# Patient Record
Sex: MALE | Race: WHITE | NOT HISPANIC OR LATINO | Employment: FULL TIME | URBAN - METROPOLITAN AREA
[De-identification: names, ages, dates, MRNs, and addresses within clinical notes are randomized per-mention and may not be internally consistent; named-entity substitution may affect disease eponyms.]

---

## 2017-03-07 ENCOUNTER — TELEPHONE (OUTPATIENT)
Dept: ONCOLOGY | Facility: CLINIC | Age: 52
End: 2017-03-07

## 2017-03-07 ENCOUNTER — TELEPHONE (OUTPATIENT)
Dept: ONCOLOGY | Facility: HOSPITAL | Age: 52
End: 2017-03-07

## 2017-03-07 DIAGNOSIS — C20 RECTAL CANCER (HCC): Primary | ICD-10-CM

## 2017-03-07 NOTE — TELEPHONE ENCOUNTER
----- Message from Dane Viera sent at 3/6/2017  6:39 PM EST -----  Regarding: Visit Follow-Up Question  Contact: [phone]  Please let me know what the timeline is for my post-treatment follow ups, scans, tests etc. My last surgery, for the reversal, was on November 7, 2016.     I have another appointment with Dr. Aguilera on March 7th and would like to have the overall scheduling coordinated here so I know when I should be in for all follow up tests. To date, I have not heard anything from [facility] since October of last year.    Tree Viera

## 2017-03-07 NOTE — TELEPHONE ENCOUNTER
Pt sent [employer] message because he was wondering about follow up care. Pt has not been seen here since October. D/W Dr. Pardo. Per Dr. Pardo, pt needs to be set up to see her next week. Informed pt and he v/u. Message sent to scheduling to make f/u appt with Dr. Pardo.

## 2017-03-07 NOTE — TELEPHONE ENCOUNTER
"Pt. S/w little asher rn earlier this morning.  Expresses anger over someone not calling him with appts juliette for cea levels that need to be drawn frequently.  Pt. States, \" you call yourself a comprehensive cancer c[facility], but your not.\"  Saw dr. bales yesterday and she was wondering why pt. Didn't have cea levels checked.  Explained to pt. That the last time he saw dr. Pardo was 10/18/16 and in her assessment/plan that pt. Ret. In 2 wks for a cbc preoperatively and in 6 wks for a md appt here.  Pt. Cancelled appt sched. For 11/29/16 per televox.  Noticed pt. Cancelled appt with dr. ann on 11/4/16.  Did go for scans on 11/1/16. Pt. Never called back to resched. His appt.  Informed pt. Of this.  Informed pt. We do send out letters when pt are a complete \"no show.\"  Explained to pt. That due to the volume of pts we see on a daily basis it would be difficult for our staff to f/u with every pt That cancelled their appts.  Pt. Hung up the phone.  Pt. Was basically calling to make sure we add a cea level to the appt we gave him for 3/15/17.  The above conversation was discussed with dr. Pardo.  Will add a cea level to 3/15/17 appt.  Called pt. Back and left this message on his voice mail.  "

## 2017-03-07 NOTE — TELEPHONE ENCOUNTER
----- Message from Faye Figueroa sent at 3/7/2017 11:06 AM EST -----  Contact: [phone]  Return call. Had another question for nurse ?

## 2017-03-15 ENCOUNTER — APPOINTMENT (OUTPATIENT)
Dept: ONCOLOGY | Facility: CLINIC | Age: 52
End: 2017-03-15

## 2017-03-15 ENCOUNTER — APPOINTMENT (OUTPATIENT)
Dept: LAB | Facility: HOSPITAL | Age: 52
End: 2017-03-15

## 2017-03-21 ENCOUNTER — APPOINTMENT (OUTPATIENT)
Dept: ONCOLOGY | Facility: CLINIC | Age: 52
End: 2017-03-21

## 2017-03-21 ENCOUNTER — APPOINTMENT (OUTPATIENT)
Dept: LAB | Facility: HOSPITAL | Age: 52
End: 2017-03-21

## 2017-03-29 ENCOUNTER — LAB (OUTPATIENT)
Dept: LAB | Facility: HOSPITAL | Age: 52
End: 2017-03-29

## 2017-03-29 ENCOUNTER — OFFICE VISIT (OUTPATIENT)
Dept: ONCOLOGY | Facility: CLINIC | Age: 52
End: 2017-03-29

## 2017-03-29 VITALS
SYSTOLIC BLOOD PRESSURE: 124 MMHG | BODY MASS INDEX: 24.78 KG/M2 | HEART RATE: 62 BPM | OXYGEN SATURATION: 98 % | WEIGHT: 177 LBS | HEIGHT: 71 IN | RESPIRATION RATE: 16 BRPM | TEMPERATURE: 97.8 F | DIASTOLIC BLOOD PRESSURE: 84 MMHG

## 2017-03-29 DIAGNOSIS — C20 RECTAL CANCER (HCC): Primary | ICD-10-CM

## 2017-03-29 DIAGNOSIS — C20 RECTAL CANCER (HCC): ICD-10-CM

## 2017-03-29 LAB
BASOPHILS # BLD AUTO: 0.04 10*3/MM3 (ref 0–0.1)
BASOPHILS NFR BLD AUTO: 1 % (ref 0–1.1)
CEA SERPL-MCNC: 1.74 NG/ML
DEPRECATED RDW RBC AUTO: 40.5 FL (ref 37–49)
EOSINOPHIL # BLD AUTO: 0.24 10*3/MM3 (ref 0–0.36)
EOSINOPHIL NFR BLD AUTO: 6 % (ref 1–5)
ERYTHROCYTE [DISTWIDTH] IN BLOOD BY AUTOMATED COUNT: 11.9 % (ref 11.7–14.5)
HCT VFR BLD AUTO: 40.8 % (ref 40–49)
HGB BLD-MCNC: 13.6 G/DL (ref 13.5–16.5)
IMM GRANULOCYTES # BLD: 0.02 10*3/MM3 (ref 0–0.03)
IMM GRANULOCYTES NFR BLD: 0.5 % (ref 0–0.5)
LYMPHOCYTES # BLD AUTO: 1.35 10*3/MM3 (ref 1–3.5)
LYMPHOCYTES NFR BLD AUTO: 33.5 % (ref 20–49)
MCH RBC QN AUTO: 30.8 PG (ref 27–33)
MCHC RBC AUTO-ENTMCNC: 33.3 G/DL (ref 32–35)
MCV RBC AUTO: 92.3 FL (ref 83–97)
MONOCYTES # BLD AUTO: 0.52 10*3/MM3 (ref 0.25–0.8)
MONOCYTES NFR BLD AUTO: 12.9 % (ref 4–12)
NEUTROPHILS # BLD AUTO: 1.86 10*3/MM3 (ref 1.5–7)
NEUTROPHILS NFR BLD AUTO: 46.1 % (ref 39–75)
NRBC BLD MANUAL-RTO: 0 /100 WBC (ref 0–0)
PLATELET # BLD AUTO: 168 10*3/MM3 (ref 150–375)
PMV BLD AUTO: 8.6 FL (ref 8.9–12.1)
RBC # BLD AUTO: 4.42 10*6/MM3 (ref 4.3–5.5)
WBC NRBC COR # BLD: 4.03 10*3/MM3 (ref 4–10)

## 2017-03-29 PROCEDURE — 82378 CARCINOEMBRYONIC ANTIGEN: CPT | Performed by: INTERNAL MEDICINE

## 2017-03-29 PROCEDURE — 99213 OFFICE O/P EST LOW 20 MIN: CPT | Performed by: INTERNAL MEDICINE

## 2017-03-29 PROCEDURE — 36415 COLL VENOUS BLD VENIPUNCTURE: CPT

## 2017-03-29 PROCEDURE — 85025 COMPLETE CBC W/AUTO DIFF WBC: CPT

## 2017-03-29 NOTE — PROGRESS NOTES
Subjective    REASONS FOR FOLLOW-UP : Rectal cancer      History of Present Illness    Mr. Viera is a 51-year-old man returning today for follow-up for rectal cancer.  He was treated neoadjuvantly with Xeloda and radiation to a dose of 5040 cGy completed in early March 2016.  He then underwent surgical resection by Dr. Aguilera with final pathology showing a low-grade mucinous adenocarcinoma T3 N0 M0 with a questionable positive radial pelvic sidewall margin.  Surgical clips were placed in the area of concern in the pelvic sidewall, and he completed a radiation boost to this area.  The tumor had a regression score of 2 and histologic features suggestive of MSI; immunohistochemistry of tumor cells was positive for MLH-1, MSH-2, and MSH-6.  He completed 8 cycles of adjuvant FOLFOX 6/10/1816 with dose reductions for neuropathy and diarrhea.    He returned to clinic today upset that he had not had a CEA checked in some time.  This had been scheduled for him in November and he canceled the appointment.  He felt letdown that our clinic didn't call to reschedule his surveillance visits, and I explained to the patient that when he cancels an appointment the responsibility is with himself to call back and get things rescheduled.  He did not agree with this.    From a symptom standpoint, he is having no weight loss, abdominal pain, blood in the stool.  He denies shortness of breath or cough.  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  His ostomy has been reversed.    .    Oncology/Hematology History    1.  Rectal cancer diagnosed January 2016 after presenting with a several month history of rectal bleeding, diarrhea and 10 lb weight loss; clinical stage T3N1M0  2.  Completed neoadjuvant Xeloda with radiation (5040cGy) 3/2/16  3. Surgery May 9 2016  pathology revealed a residual 5cm invasive mucinous adenocarcinoma, low grade, invading through the muscularis propria and into the perirectal adipose  tissue with an initial positive radial margin. There was no evidence of lymphvascular space invasion and thirteen lymph nodes were negative for metastatic involvement. Additional distal and radial margin re-excision specimens were negative for carcinoma and excision of right pelvic lateral sidewall was negative for carcinoma. According to Dr. Aguilera's operative report, the rectum/rectal tumor was densely adhered to the right pelvic side wall and she placed clips in this area for possible adjuvant radiation boost. The initial frozen margins came back negative but due to her concern, she submitted additional right lateral and distal margins and according to the pathology report these additional specimens were negative for carcinoma. However, a comment on the path report states that an permanent sections an additional section of tumor bed was taken, revealing invasive carcinoma that focally extended to the radial margin and this positive margin was within 5mm fo the area taken on frozen section and it is unclear whether the additional radial margin would have covered this; he completed a post-surgery radiation boost to the right pelvic sidewall  4.  Initiated adjuvant FOLFOX-6 6/29/16 and 8 cycles completed 10/18/16 with dose reductions secondary to neuropathy and diarrhea        Rectal cancer    1/19/2016 Initial Diagnosis    Rectal cancer       PAST MEDICAL HISTORY:   1. Anxiety.   2. History of septal nose surgery in 1996.   3. History of benign cyst removed from the left foot in 1983.      SOCIAL HISTORY: He is , has 1 daughter. He is currently employed as a . He has never smoked. Drinks 2-3 glasses of wine per month.  Denies illicit drug use or HIV risk factors.     FAMILY HISTORY: His maternal grandmother had colon cancer in her 70s. No other malignancies in the family. His father had heart disease, hypertension and diabetes.         Review of Systems   Constitutional: Negative for  "activity change, fatigue and unexpected weight change.   Respiratory: Negative for shortness of breath and wheezing.    Cardiovascular: Negative for chest pain and leg swelling.   Gastrointestinal: Negative for abdominal pain, diarrhea, nausea and rectal pain.   Musculoskeletal: Negative for arthralgias and back pain.   Skin: Negative for color change.   Neurological: Negative for dizziness and weakness.      A comprehensive 14 point review of systems was performed and was negative except as mentioned.    Medications:  The current medication list was reviewed in the EMR    ALLERGIES:  No Known Allergies    Objective      Vitals:    03/29/17 1053   BP: 124/84   Pulse: 62   Resp: 16   Temp: 97.8 °F (36.6 °C)   SpO2: 98%   Weight: 177 lb (80.3 kg)   Height: 71.26\" (181 cm)   PainSc: 0-No pain     Current Status 3/29/2017   ECOG score 0       Physical Exam   Constitutional: He appears well-developed and well-nourished. No distress.   Eyes: Left eye exhibits no discharge. No scleral icterus.   Cardiovascular: Exam reveals no friction rub.    No murmur heard.  Pulmonary/Chest: No respiratory distress. He exhibits no tenderness.   Abdominal: He exhibits no mass.   Ostomy reversed with well-healed scar; no palpable masses or tenderness   Musculoskeletal: He exhibits no edema.   Lymphadenopathy:     He has no cervical adenopathy.   Skin: Skin is warm and dry.   Psychiatric: He has a normal mood and affect.         RECENT LABS:  Hematology WBC   Date Value Ref Range Status   03/29/2017 4.03 4.00 - 10.00 10*3/mm3 Final     RBC   Date Value Ref Range Status   03/29/2017 4.42 4.30 - 5.50 10*6/mm3 Final     Hemoglobin   Date Value Ref Range Status   03/29/2017 13.6 13.5 - 16.5 g/dL Final     Hematocrit   Date Value Ref Range Status   03/29/2017 40.8 40.0 - 49.0 % Final     Platelets   Date Value Ref Range Status   03/29/2017 168 150 - 375 10*3/mm3 Final     Lab Results   Component Value Date    GLUCOSE 91 10/18/2016    BUN 10 " 10/18/2016    CREATININE 0.90 11/01/2016    EGFRIFNONA 100 10/18/2016    EGFRIFAFRI  09/20/2016      Comment:      <15 Indicative of kidney failure.    BCR 12.2 10/18/2016    K 3.7 10/18/2016    CO2 25.0 10/18/2016    CALCIUM 8.8 10/18/2016    ALBUMIN 4.00 10/18/2016    LABIL2 1.5 10/18/2016    AST 33 10/18/2016    ALT 34 10/18/2016     cea pending           Assessment/Plan   Mr. Viera returned today for continuation of care for his rectal cancer.  He underwent neoadjuvant chemoradiation for a clinical T3 N1 M0 rectal cancer.  At the time of surgery final pathology showed YT3 N0 M0 disease with 13 negative lymph nodes.  There was a questionable positive radial margin involving the right pelvic sidewall and he completed a radiation boost to this area. He completed 8 cycles of FOLFOX 6 with dose reductions in October 2016.  We have drawn a CEA level today which is pending; the patient can review results on my chart and call us if there are questions.  I recommended that we perform surveillance CT scans of chest, abdomen, and pelvis every 6 months for the first 2 years and then annually until year 5.  He will be due for follow-up scans in 2 months which I scheduled along with a repeat CBC, CEA, and CMP.  He has mild peripheral neuropathy which I recommended perhaps a B complex vitamin could help.  Lastly, I referred the patients to genetics for review given his early age of colorectal cancer and pathologic features.    As an aside, I explained to the patient if he cancels appointments, the responsibility lies with him to call back and reschedule.            3/29/2017      CC:

## 2017-05-09 ENCOUNTER — CLINICAL SUPPORT (OUTPATIENT)
Dept: OTHER | Facility: HOSPITAL | Age: 52
End: 2017-05-09
Attending: INTERNAL MEDICINE

## 2017-05-09 DIAGNOSIS — Z80.3 FAMILY HISTORY OF MALIGNANT NEOPLASM OF BREAST: ICD-10-CM

## 2017-05-09 DIAGNOSIS — C20 MALIGNANT NEOPLASM OF RECTUM (HCC): Primary | ICD-10-CM

## 2017-05-09 DIAGNOSIS — Z80.0 FAMILY HISTORY OF MALIGNANT NEOPLASM OF GASTROINTESTINAL TRACT: ICD-10-CM

## 2017-05-09 PROCEDURE — G0463 HOSPITAL OUTPT CLINIC VISIT: HCPCS

## 2017-05-17 ENCOUNTER — LAB (OUTPATIENT)
Dept: LAB | Facility: HOSPITAL | Age: 52
End: 2017-05-17

## 2017-05-17 ENCOUNTER — HOSPITAL ENCOUNTER (OUTPATIENT)
Dept: PET IMAGING | Facility: HOSPITAL | Age: 52
Discharge: HOME | End: 2017-05-17
Attending: INTERNAL MEDICINE | Admitting: INTERNAL MEDICINE

## 2017-05-17 DIAGNOSIS — C20 RECTAL CANCER (HCC): ICD-10-CM

## 2017-05-17 LAB
ALBUMIN SERPL-MCNC: 4.5 G/DL (ref 3.5–5.2)
ALBUMIN/GLOB SERPL: 1.6 G/DL (ref 1.1–2.4)
ALP SERPL-CCNC: 80 U/L (ref 38–116)
ALT SERPL W P-5'-P-CCNC: 28 U/L (ref 0–41)
ANION GAP SERPL CALCULATED.3IONS-SCNC: 13.8 MMOL/L
AST SERPL-CCNC: 25 U/L (ref 0–40)
BASOPHILS # BLD AUTO: 0.05 10*3/MM3 (ref 0–0.1)
BASOPHILS NFR BLD AUTO: 1.1 % (ref 0–1.1)
BILIRUB SERPL-MCNC: 0.7 MG/DL (ref 0.1–1.2)
BUN BLD-MCNC: 12 MG/DL (ref 6–20)
BUN/CREAT SERPL: 14.5 (ref 7.3–30)
CALCIUM SPEC-SCNC: 9.3 MG/DL (ref 8.5–10.2)
CEA SERPL-MCNC: 1.92 NG/ML
CHLORIDE SERPL-SCNC: 96 MMOL/L (ref 98–107)
CO2 SERPL-SCNC: 28.2 MMOL/L (ref 22–29)
CREAT BLD-MCNC: 0.83 MG/DL (ref 0.7–1.3)
CREAT BLDA-MCNC: 0.9 MG/DL (ref 0.6–1.3)
DEPRECATED RDW RBC AUTO: 43.1 FL (ref 37–49)
EOSINOPHIL # BLD AUTO: 0.14 10*3/MM3 (ref 0–0.36)
EOSINOPHIL NFR BLD AUTO: 3 % (ref 1–5)
ERYTHROCYTE [DISTWIDTH] IN BLOOD BY AUTOMATED COUNT: 12.3 % (ref 11.7–14.5)
GFR SERPL CREATININE-BSD FRML MDRD: 98 ML/MIN/1.73
GLOBULIN UR ELPH-MCNC: 2.8 GM/DL (ref 1.8–3.5)
GLUCOSE BLD-MCNC: 83 MG/DL (ref 74–124)
HCT VFR BLD AUTO: 41.7 % (ref 40–49)
HGB BLD-MCNC: 14.4 G/DL (ref 13.5–16.5)
IMM GRANULOCYTES # BLD: 0 10*3/MM3 (ref 0–0.03)
IMM GRANULOCYTES NFR BLD: 0 % (ref 0–0.5)
LYMPHOCYTES # BLD AUTO: 1.42 10*3/MM3 (ref 1–3.5)
LYMPHOCYTES NFR BLD AUTO: 30 % (ref 20–49)
MCH RBC QN AUTO: 32.5 PG (ref 27–33)
MCHC RBC AUTO-ENTMCNC: 34.5 G/DL (ref 32–35)
MCV RBC AUTO: 94.1 FL (ref 83–97)
MONOCYTES # BLD AUTO: 0.49 10*3/MM3 (ref 0.25–0.8)
MONOCYTES NFR BLD AUTO: 10.3 % (ref 4–12)
NEUTROPHILS # BLD AUTO: 2.64 10*3/MM3 (ref 1.5–7)
NEUTROPHILS NFR BLD AUTO: 55.6 % (ref 39–75)
NRBC BLD MANUAL-RTO: 0 /100 WBC (ref 0–0)
PLATELET # BLD AUTO: 232 10*3/MM3 (ref 150–375)
PMV BLD AUTO: 9.4 FL (ref 8.9–12.1)
POTASSIUM BLD-SCNC: 3.6 MMOL/L (ref 3.5–4.7)
PROT SERPL-MCNC: 7.3 G/DL (ref 6.3–8)
RBC # BLD AUTO: 4.43 10*6/MM3 (ref 4.3–5.5)
SODIUM BLD-SCNC: 138 MMOL/L (ref 134–145)
WBC NRBC COR # BLD: 4.74 10*3/MM3 (ref 4–10)

## 2017-05-17 PROCEDURE — 71260 CT THORAX DX C+: CPT

## 2017-05-17 PROCEDURE — 0 IOPAMIDOL 61 % SOLUTION: Performed by: INTERNAL MEDICINE

## 2017-05-17 PROCEDURE — 82565 ASSAY OF CREATININE: CPT

## 2017-05-17 PROCEDURE — 0 DIATRIZOATE MEGLUMINE & SODIUM PER 1 ML: Performed by: INTERNAL MEDICINE

## 2017-05-17 PROCEDURE — 74177 CT ABD & PELVIS W/CONTRAST: CPT

## 2017-05-17 PROCEDURE — 36415 COLL VENOUS BLD VENIPUNCTURE: CPT

## 2017-05-17 PROCEDURE — 82378 CARCINOEMBRYONIC ANTIGEN: CPT | Performed by: INTERNAL MEDICINE

## 2017-05-17 PROCEDURE — 85025 COMPLETE CBC W/AUTO DIFF WBC: CPT

## 2017-05-17 PROCEDURE — 80053 COMPREHEN METABOLIC PANEL: CPT

## 2017-05-17 RX ADMIN — IOPAMIDOL 85 ML: 612 INJECTION, SOLUTION INTRAVENOUS at 08:50

## 2017-05-17 RX ADMIN — DIATRIZOATE MEGLUMINE AND DIATRIZOATE SODIUM 30 ML: 660; 100 LIQUID ORAL; RECTAL at 07:49

## 2017-05-22 RX ORDER — ESCITALOPRAM OXALATE 20 MG/1
TABLET ORAL
Qty: 30 TABLET | Refills: 5 | Status: SHIPPED | OUTPATIENT
Start: 2017-05-22 | End: 2017-11-22 | Stop reason: SDUPTHER

## 2017-05-24 ENCOUNTER — APPOINTMENT (OUTPATIENT)
Dept: LAB | Facility: HOSPITAL | Age: 52
End: 2017-05-24

## 2017-05-24 ENCOUNTER — OFFICE VISIT (OUTPATIENT)
Dept: ONCOLOGY | Facility: CLINIC | Age: 52
End: 2017-05-24

## 2017-05-24 VITALS
RESPIRATION RATE: 16 BRPM | SYSTOLIC BLOOD PRESSURE: 112 MMHG | DIASTOLIC BLOOD PRESSURE: 78 MMHG | HEART RATE: 61 BPM | HEIGHT: 71 IN | TEMPERATURE: 97.7 F | BODY MASS INDEX: 24.72 KG/M2 | WEIGHT: 176.6 LBS | OXYGEN SATURATION: 98 %

## 2017-05-24 DIAGNOSIS — C20 RECTAL CANCER (HCC): Primary | ICD-10-CM

## 2017-05-24 DIAGNOSIS — E53.8 B12 DEFICIENCY: ICD-10-CM

## 2017-05-24 PROCEDURE — G0463 HOSPITAL OUTPT CLINIC VISIT: HCPCS | Performed by: INTERNAL MEDICINE

## 2017-05-24 PROCEDURE — 99214 OFFICE O/P EST MOD 30 MIN: CPT | Performed by: INTERNAL MEDICINE

## 2017-05-30 ENCOUNTER — CLINICAL SUPPORT (OUTPATIENT)
Dept: OTHER | Facility: HOSPITAL | Age: 52
End: 2017-05-30

## 2017-05-30 DIAGNOSIS — C20 MALIGNANT NEOPLASM OF RECTUM (HCC): Primary | ICD-10-CM

## 2017-05-30 DIAGNOSIS — Z80.3 FAMILY HISTORY OF MALIGNANT NEOPLASM OF BREAST: ICD-10-CM

## 2017-05-30 DIAGNOSIS — Z80.0 FAMILY HISTORY OF MALIGNANT NEOPLASM OF GASTROINTESTINAL TRACT: ICD-10-CM

## 2017-05-30 PROCEDURE — G0463 HOSPITAL OUTPT CLINIC VISIT: HCPCS

## 2017-06-13 RX ORDER — CYCLOBENZAPRINE HCL 10 MG
10 TABLET ORAL 3 TIMES DAILY PRN
Qty: 20 TABLET | Refills: 0 | Status: SHIPPED | OUTPATIENT
Start: 2017-06-13 | End: 2017-08-11

## 2017-06-13 NOTE — TELEPHONE ENCOUNTER
Patient calling c/o low back pain. He feels like he can hardly move at times. Its been going on the past 2 weeks. Wants to know if this is something to be concerned about? Reviewed with Dr Pardo, it sounds like it could be muscular related. Ok to prescribed patient Flexoril 10mg TID PRN #20 No refills. If patient has problems after using this then he will need to be seen by somebody for it. Reviewed with patient and he v/u. Medication sent to [facility]

## 2017-08-07 ENCOUNTER — HOSPITAL ENCOUNTER (OUTPATIENT)
Dept: PET IMAGING | Facility: HOSPITAL | Age: 52
Discharge: HOME | End: 2017-08-07
Attending: INTERNAL MEDICINE

## 2017-08-07 ENCOUNTER — TELEPHONE (OUTPATIENT)
Dept: GENERAL RADIOLOGY | Facility: HOSPITAL | Age: 52
End: 2017-08-07

## 2017-08-08 ENCOUNTER — HOSPITAL ENCOUNTER (OUTPATIENT)
Dept: PET IMAGING | Facility: HOSPITAL | Age: 52
Discharge: HOME | End: 2017-08-08
Attending: INTERNAL MEDICINE | Admitting: INTERNAL MEDICINE

## 2017-08-08 ENCOUNTER — LAB (OUTPATIENT)
Dept: LAB | Facility: HOSPITAL | Age: 52
End: 2017-08-08

## 2017-08-08 DIAGNOSIS — C20 RECTAL CANCER (HCC): ICD-10-CM

## 2017-08-08 DIAGNOSIS — E53.8 B12 DEFICIENCY: ICD-10-CM

## 2017-08-08 LAB
ALBUMIN SERPL-MCNC: 4.4 G/DL (ref 3.5–5.2)
ALBUMIN/GLOB SERPL: 1.6 G/DL (ref 1.1–2.4)
ALP SERPL-CCNC: 67 U/L (ref 38–116)
ALT SERPL W P-5'-P-CCNC: 26 U/L (ref 0–41)
ANION GAP SERPL CALCULATED.3IONS-SCNC: 15.5 MMOL/L
AST SERPL-CCNC: 23 U/L (ref 0–40)
BASOPHILS # BLD AUTO: 0.05 10*3/MM3 (ref 0–0.1)
BASOPHILS NFR BLD AUTO: 1.2 % (ref 0–1.1)
BILIRUB SERPL-MCNC: 1.5 MG/DL (ref 0.1–1.2)
BUN BLD-MCNC: 13 MG/DL (ref 6–20)
BUN/CREAT SERPL: 15.3 (ref 7.3–30)
CALCIUM SPEC-SCNC: 9.2 MG/DL (ref 8.5–10.2)
CEA SERPL-MCNC: 1.84 NG/ML
CHLORIDE SERPL-SCNC: 101 MMOL/L (ref 98–107)
CO2 SERPL-SCNC: 26.5 MMOL/L (ref 22–29)
CREAT BLD-MCNC: 0.85 MG/DL (ref 0.7–1.3)
CREAT BLDA-MCNC: 1 MG/DL (ref 0.6–1.3)
DEPRECATED RDW RBC AUTO: 39.2 FL (ref 37–49)
EOSINOPHIL # BLD AUTO: 0.09 10*3/MM3 (ref 0–0.36)
EOSINOPHIL NFR BLD AUTO: 2.1 % (ref 1–5)
ERYTHROCYTE [DISTWIDTH] IN BLOOD BY AUTOMATED COUNT: 11.9 % (ref 11.7–14.5)
GFR SERPL CREATININE-BSD FRML MDRD: 95 ML/MIN/1.73
GLOBULIN UR ELPH-MCNC: 2.7 GM/DL (ref 1.8–3.5)
GLUCOSE BLD-MCNC: 92 MG/DL (ref 74–124)
HCT VFR BLD AUTO: 43.2 % (ref 40–49)
HGB BLD-MCNC: 15.1 G/DL (ref 13.5–16.5)
IMM GRANULOCYTES # BLD: 0.01 10*3/MM3 (ref 0–0.03)
IMM GRANULOCYTES NFR BLD: 0.2 % (ref 0–0.5)
LYMPHOCYTES # BLD AUTO: 1.23 10*3/MM3 (ref 1–3.5)
LYMPHOCYTES NFR BLD AUTO: 29.1 % (ref 20–49)
MCH RBC QN AUTO: 31.7 PG (ref 27–33)
MCHC RBC AUTO-ENTMCNC: 35 G/DL (ref 32–35)
MCV RBC AUTO: 90.8 FL (ref 83–97)
MONOCYTES # BLD AUTO: 0.33 10*3/MM3 (ref 0.25–0.8)
MONOCYTES NFR BLD AUTO: 7.8 % (ref 4–12)
NEUTROPHILS # BLD AUTO: 2.51 10*3/MM3 (ref 1.5–7)
NEUTROPHILS NFR BLD AUTO: 59.6 % (ref 39–75)
NRBC BLD MANUAL-RTO: 0 /100 WBC (ref 0–0)
PLATELET # BLD AUTO: 253 10*3/MM3 (ref 150–375)
PMV BLD AUTO: 9.5 FL (ref 8.9–12.1)
POTASSIUM BLD-SCNC: 3.9 MMOL/L (ref 3.5–4.7)
PROT SERPL-MCNC: 7.1 G/DL (ref 6.3–8)
RBC # BLD AUTO: 4.76 10*6/MM3 (ref 4.3–5.5)
SODIUM BLD-SCNC: 143 MMOL/L (ref 134–145)
WBC NRBC COR # BLD: 4.22 10*3/MM3 (ref 4–10)

## 2017-08-08 PROCEDURE — 80053 COMPREHEN METABOLIC PANEL: CPT | Performed by: INTERNAL MEDICINE

## 2017-08-08 PROCEDURE — 0 IOPAMIDOL 61 % SOLUTION: Performed by: INTERNAL MEDICINE

## 2017-08-08 PROCEDURE — 85025 COMPLETE CBC W/AUTO DIFF WBC: CPT | Performed by: INTERNAL MEDICINE

## 2017-08-08 PROCEDURE — 82378 CARCINOEMBRYONIC ANTIGEN: CPT | Performed by: INTERNAL MEDICINE

## 2017-08-08 PROCEDURE — 71260 CT THORAX DX C+: CPT

## 2017-08-08 PROCEDURE — 82565 ASSAY OF CREATININE: CPT

## 2017-08-08 PROCEDURE — 36415 COLL VENOUS BLD VENIPUNCTURE: CPT | Performed by: INTERNAL MEDICINE

## 2017-08-08 RX ADMIN — IOPAMIDOL 75 ML: 612 INJECTION, SOLUTION INTRAVENOUS at 12:57

## 2017-08-11 ENCOUNTER — APPOINTMENT (OUTPATIENT)
Dept: LAB | Facility: HOSPITAL | Age: 52
End: 2017-08-11

## 2017-08-11 ENCOUNTER — OFFICE VISIT (OUTPATIENT)
Dept: ONCOLOGY | Facility: CLINIC | Age: 52
End: 2017-08-11

## 2017-08-11 VITALS
BODY MASS INDEX: 24.85 KG/M2 | SYSTOLIC BLOOD PRESSURE: 102 MMHG | HEART RATE: 60 BPM | DIASTOLIC BLOOD PRESSURE: 68 MMHG | HEIGHT: 70 IN | TEMPERATURE: 98 F | WEIGHT: 173.6 LBS | RESPIRATION RATE: 14 BRPM | OXYGEN SATURATION: 97 %

## 2017-08-11 DIAGNOSIS — E53.8 B12 DEFICIENCY: ICD-10-CM

## 2017-08-11 DIAGNOSIS — C20 RECTAL CANCER (HCC): Primary | ICD-10-CM

## 2017-08-11 PROCEDURE — G0463 HOSPITAL OUTPT CLINIC VISIT: HCPCS | Performed by: INTERNAL MEDICINE

## 2017-08-11 PROCEDURE — 99214 OFFICE O/P EST MOD 30 MIN: CPT | Performed by: INTERNAL MEDICINE

## 2017-08-11 NOTE — PROGRESS NOTES
Subjective    REASONS FOR FOLLOW-UP :  1.  Rectal cancer T3N1M0 treated with neoadjuvant chemoradiation  2. ypT3N0 at surgery 5/17 with a positive radial margin reexcised and treated with radiation boost to the right pelvic sidewall postoperatively  3.  Adjuvant FOLFOX 6 x 8 completed in 10/16  4.  Additional surgery in 11/16 with colostomy reversal  5.  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  Unexplained small bilateral pleural effusions first noted in 5/17 not seen in 11/16 CAT scan of the chest     History of Present Illness    Mr. Viera is a 51-year-old man returning today for follow-up for rectal cancer.  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  He is here to review his CAT scans of the chest because of the small nondisplaced pleural effusions and unfortunately they're still present and maybe microscopically little more prominent.  He is asymptomatic from this.  He did call with some low back pain in the sacroiliac region for which we prescribed some Flexeril which apparently did not help.  The pain gradually resolved and is gone currently and may have been related to some pushups and planks that he was doing to keep himself fit.    I suggested a repeat CAT scan in 3 months and a pulmonary consultation in the interim to see if they have any other suggestions.  His CEA remains normal at 1.82    Oncology/Hematology History    1.  Rectal cancer diagnosed January 2016 after presenting with a several month history of rectal bleeding, diarrhea and 10 lb weight loss; clinical stage T3N1M0  2.  Completed neoadjuvant Xeloda with radiation (5040cGy) 3/2/16  3. Surgery May 9 2016  pathology revealed a residual 5cm invasive mucinous adenocarcinoma, low grade, invading through the muscularis propria and into the perirectal adipose tissue  with an initial positive radial margin. There was no evidence of lymphvascular space invasion and thirteen lymph nodes were negative for metastatic involvement. Additional distal and radial margin re-excision specimens were negative for carcinoma and excision of right pelvic lateral sidewall was negative for carcinoma. According to Dr. Aguilera's operative report, the rectum/rectal tumor was densely adhered to the right pelvic side wall and she placed clips in this area for possible adjuvant radiation boost. The initial frozen margins came back negative but due to her concern, she submitted additional right lateral and distal margins and according to the pathology report these additional specimens were negative for carcinoma. However, a comment on the path report states that an permanent sections an additional section of tumor bed was taken, revealing invasive carcinoma that focally extended to the radial margin and this positive margin was within 5mm fo the area taken on frozen section and it is unclear whether the additional radial margin would have covered this; he completed a post-surgery radiation boost to the right pelvic sidewall  4.  Initiated adjuvant FOLFOX-6 6/29/16 and 8 cycles completed 10/18/16 with dose reductions secondary to neuropathy and diarrhea        Rectal cancer    1/19/2016 Initial Diagnosis     Rectal cancer        5/17   He was treated neoadjuvantly with Xeloda and radiation to a dose of 5040 cGy completed in early March 2016.  He then underwent surgical resection by Dr. Aguilera with final pathology showing a low-grade mucinous adenocarcinoma T3 N0 M0 with a questionable positive radial pelvic sidewall margin.  Surgical clips were placed in the area of concern in the pelvic sidewall, and he completed a radiation boost to this area.  The tumor had a regression score of 2 and histologic features suggestive of MSI; immunohistochemistry of tumor cells was positive for MLH-1, MSH-2, and  [facility]-6.  He completed 8 cycles of adjuvant FOLFOX 6/10/1816 with dose reductions for neuropathy and diarrhea.    Patient returns today to review his CAT scans and overall is doing fairly well.  He has no cough or shortness of breath weight and appetite are stable.  His CEA was 1.92 which is normal.  CAT scans show minimal small pleural effusions which were not seen in November prior to his second surgery.  There are very small and not tappable and I suspect there residual from his surgery and I will try and get some x-rays from [facility] if they were done postoperatively.  I recommended we repeat his chest CT in 3 months to make sure there is resolution and no progression of these findings and overall I'm not very worried that these are related to metastatic disease.    He has had blood drawn for genetic testing and the results are pending.  He scheduled follow-up colonoscopy next in November of this year      PAST MEDICAL HISTORY:   1. Anxiety.   2. History of septal nose surgery in 1996.   3. History of benign cyst removed from the left foot in 1983.      SOCIAL HISTORY: He is , has 1 daughter. He is currently employed as a . He has never smoked. Drinks 2-3 glasses of wine per month.  Denies illicit drug use or HIV risk factors.     FAMILY HISTORY: His maternal grandmother had colon cancer in her 70s. No other malignancies in the family. His father had heart disease, hypertension and diabetes.         Review of Systems   Constitutional: Negative for activity change, fatigue and unexpected weight change.   Respiratory: Negative for shortness of breath and wheezing.    Cardiovascular: Negative for chest pain and leg swelling.   Gastrointestinal: Negative for abdominal pain, diarrhea, nausea and rectal pain.   Musculoskeletal: Negative for arthralgias and back pain.   Skin: Negative for color change.   Neurological: Negative for dizziness and weakness.      A  comprehensive 14 point review of systems was performed and was negative except as mentioned.    Medications:  The current medication list was reviewed in the EMR    ALLERGIES:  No Known Allergies    Objective      There were no vitals filed for this visit.  Current Status 8/11/2017   ECOG score 0       Physical Exam    GENERAL:  Well-developed, well-nourished in no acute distress.   SKIN:  Warm, dry without rashes, purpura or petechiae.  EYES:  Pupils equal, round and reactive to light.  EOMs intact.  Conjunctivae normal.  EARS:  Hearing intact.  NOSE:  Septum midline.  No excoriations or nasal discharge.  MOUTH:  Tongue is well-papillated; no stomatitis or ulcers.  Lips normal.  THROAT:  Oropharynx without lesions or exudates.  NECK:  Supple with good range of motion; no thyromegaly or masses, no JVD.  LYMPHATICS:  No cervical, supraclavicular, axillary or inguinal adenopathy.  CHEST:  Lungs clear to auscultation. Good airflow.  CARDIAC:  Regular rate and rhythm without murmurs, rubs or gallops. Normal S1,S2.  ABDOMEN:  Not examined  EXTREMITIES:  No clubbing, cyanosis or edema.  NEUROLOGICAL:  Cranial Nerves II-XII grossly intact.  No focal neurological deficits.  PSYCHIATRIC:  Normal affect and mood.  '    RECENT LABS:  Hematology WBC   Date Value Ref Range Status   08/08/2017 4.22 4.00 - 10.00 10*3/mm3 Final     RBC   Date Value Ref Range Status   08/08/2017 4.76 4.30 - 5.50 10*6/mm3 Final     Hemoglobin   Date Value Ref Range Status   08/08/2017 15.1 13.5 - 16.5 g/dL Final     Hematocrit   Date Value Ref Range Status   08/08/2017 43.2 40.0 - 49.0 % Final     Platelets   Date Value Ref Range Status   08/08/2017 253 150 - 375 10*3/mm3 Final     Lab Results   Component Value Date    GLUCOSE 92 08/08/2017    BUN 13 08/08/2017    CREATININE 1.00 08/08/2017    EGFRIFNONA 95 08/08/2017    EGFRIFAFRI  09/20/2016      Comment:      <15 Indicative of kidney failure.    BCR 15.3 08/08/2017    K 3.9 08/08/2017    CO2 26.5  08/08/2017    CALCIUM 9.2 08/08/2017    ALBUMIN 4.40 08/08/2017    LABIL2 1.6 08/08/2017    AST 23 08/08/2017    ALT 26 08/08/2017     cea 1.84    Ct chest     IMPRESSION:  Slight interval increase in the tiny right pleural effusion  and marginal increase in the smaller tiny left pleural effusion.      This report was finalized on 8/10/2017     Assessment/Plan   1.t neoadjuvant chemoradiation for a clinical T3 N1 M0 rectal cancer.  At the time of surgery final pathology showed YT3 N0 M0 disease with 13 negative lymph nodes.  There was a questionable positive radial margin involving the right pelvic sidewall and he completed a radiation boost to this area. He completed 8 cycles of FOLFOX 6 with dose reductions in October 2016.    2.  Reversal of colostomy in 11/16  3.  Follow-up CAT scans and 5/17 showed bilateral very small pleural effusions of unknown etiology follow-up scan in 3 months planned CEA normal  4. 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

## 2017-11-03 ENCOUNTER — APPOINTMENT (OUTPATIENT)
Dept: PET IMAGING | Facility: HOSPITAL | Age: 52
End: 2017-11-03
Attending: INTERNAL MEDICINE

## 2017-11-03 DIAGNOSIS — C20 RECTAL CANCER (HCC): Primary | ICD-10-CM

## 2017-11-07 ENCOUNTER — HOSPITAL ENCOUNTER (OUTPATIENT)
Dept: PET IMAGING | Facility: HOSPITAL | Age: 52
Discharge: HOME | End: 2017-11-07
Attending: INTERNAL MEDICINE | Admitting: INTERNAL MEDICINE

## 2017-11-07 ENCOUNTER — LAB (OUTPATIENT)
Dept: LAB | Facility: HOSPITAL | Age: 52
End: 2017-11-07

## 2017-11-07 DIAGNOSIS — E53.8 B12 DEFICIENCY: ICD-10-CM

## 2017-11-07 DIAGNOSIS — C20 RECTAL CANCER (HCC): ICD-10-CM

## 2017-11-07 LAB
ALBUMIN SERPL-MCNC: 4.2 G/DL (ref 3.5–5.2)
ALBUMIN/GLOB SERPL: 1.6 G/DL (ref 1.1–2.4)
ALP SERPL-CCNC: 65 U/L (ref 38–116)
ALT SERPL W P-5'-P-CCNC: 19 U/L (ref 0–41)
ANION GAP SERPL CALCULATED.3IONS-SCNC: 9.7 MMOL/L
AST SERPL-CCNC: 17 U/L (ref 0–40)
BASOPHILS # BLD AUTO: 0.04 10*3/MM3 (ref 0–0.1)
BASOPHILS NFR BLD AUTO: 1 % (ref 0–1.1)
BILIRUB SERPL-MCNC: 0.8 MG/DL (ref 0.1–1.2)
BUN BLD-MCNC: 10 MG/DL (ref 6–20)
BUN/CREAT SERPL: 11.2 (ref 7.3–30)
CALCIUM SPEC-SCNC: 9.3 MG/DL (ref 8.5–10.2)
CHLORIDE SERPL-SCNC: 103 MMOL/L (ref 98–107)
CO2 SERPL-SCNC: 28.3 MMOL/L (ref 22–29)
CREAT BLD-MCNC: 0.89 MG/DL (ref 0.7–1.3)
CREAT BLDA-MCNC: 0.9 MG/DL (ref 0.6–1.3)
DEPRECATED RDW RBC AUTO: 40.6 FL (ref 37–49)
EOSINOPHIL # BLD AUTO: 0.1 10*3/MM3 (ref 0–0.36)
EOSINOPHIL NFR BLD AUTO: 2.4 % (ref 1–5)
ERYTHROCYTE [DISTWIDTH] IN BLOOD BY AUTOMATED COUNT: 11.8 % (ref 11.7–14.5)
GFR SERPL CREATININE-BSD FRML MDRD: 90 ML/MIN/1.73
GLOBULIN UR ELPH-MCNC: 2.6 GM/DL (ref 1.8–3.5)
GLUCOSE BLD-MCNC: 99 MG/DL (ref 74–124)
HCT VFR BLD AUTO: 41.1 % (ref 40–49)
HGB BLD-MCNC: 14.1 G/DL (ref 13.5–16.5)
IMM GRANULOCYTES # BLD: 0.01 10*3/MM3 (ref 0–0.03)
IMM GRANULOCYTES NFR BLD: 0.2 % (ref 0–0.5)
LYMPHOCYTES # BLD AUTO: 1.23 10*3/MM3 (ref 1–3.5)
LYMPHOCYTES NFR BLD AUTO: 29.9 % (ref 20–49)
MCH RBC QN AUTO: 31.9 PG (ref 27–33)
MCHC RBC AUTO-ENTMCNC: 34.3 G/DL (ref 32–35)
MCV RBC AUTO: 93 FL (ref 83–97)
MONOCYTES # BLD AUTO: 0.29 10*3/MM3 (ref 0.25–0.8)
MONOCYTES NFR BLD AUTO: 7 % (ref 4–12)
NEUTROPHILS # BLD AUTO: 2.45 10*3/MM3 (ref 1.5–7)
NEUTROPHILS NFR BLD AUTO: 59.5 % (ref 39–75)
NRBC BLD MANUAL-RTO: 0 /100 WBC (ref 0–0)
PLATELET # BLD AUTO: 227 10*3/MM3 (ref 150–375)
PMV BLD AUTO: 9.2 FL (ref 8.9–12.1)
POTASSIUM BLD-SCNC: 4.4 MMOL/L (ref 3.5–4.7)
PROT SERPL-MCNC: 6.8 G/DL (ref 6.3–8)
RBC # BLD AUTO: 4.42 10*6/MM3 (ref 4.3–5.5)
SODIUM BLD-SCNC: 141 MMOL/L (ref 134–145)
WBC NRBC COR # BLD: 4.12 10*3/MM3 (ref 4–10)

## 2017-11-07 PROCEDURE — 82565 ASSAY OF CREATININE: CPT

## 2017-11-07 PROCEDURE — 36415 COLL VENOUS BLD VENIPUNCTURE: CPT | Performed by: INTERNAL MEDICINE

## 2017-11-07 PROCEDURE — 80053 COMPREHEN METABOLIC PANEL: CPT | Performed by: INTERNAL MEDICINE

## 2017-11-07 PROCEDURE — 0 IOPAMIDOL 61 % SOLUTION: Performed by: INTERNAL MEDICINE

## 2017-11-07 PROCEDURE — 74177 CT ABD & PELVIS W/CONTRAST: CPT

## 2017-11-07 PROCEDURE — 85025 COMPLETE CBC W/AUTO DIFF WBC: CPT | Performed by: INTERNAL MEDICINE

## 2017-11-07 PROCEDURE — 0 DIATRIZOATE MEGLUMINE & SODIUM PER 1 ML: Performed by: INTERNAL MEDICINE

## 2017-11-07 PROCEDURE — 71260 CT THORAX DX C+: CPT

## 2017-11-07 RX ADMIN — DIATRIZOATE MEGLUMINE AND DIATRIZOATE SODIUM 30 ML: 660; 100 LIQUID ORAL; RECTAL at 13:55

## 2017-11-07 RX ADMIN — IOPAMIDOL 85 ML: 612 INJECTION, SOLUTION INTRAVENOUS at 14:42

## 2017-11-10 ENCOUNTER — OFFICE VISIT (OUTPATIENT)
Dept: ONCOLOGY | Facility: CLINIC | Age: 52
End: 2017-11-10

## 2017-11-10 ENCOUNTER — APPOINTMENT (OUTPATIENT)
Dept: LAB | Facility: HOSPITAL | Age: 52
End: 2017-11-10

## 2017-11-10 VITALS
OXYGEN SATURATION: 97 % | WEIGHT: 175.4 LBS | RESPIRATION RATE: 16 BRPM | HEART RATE: 59 BPM | BODY MASS INDEX: 25.11 KG/M2 | SYSTOLIC BLOOD PRESSURE: 118 MMHG | TEMPERATURE: 97.9 F | DIASTOLIC BLOOD PRESSURE: 86 MMHG | HEIGHT: 70 IN

## 2017-11-10 DIAGNOSIS — C20 RECTAL CANCER (HCC): Primary | ICD-10-CM

## 2017-11-10 LAB
CEA SERPL-MCNC: 1.44 NG/ML
VIT B12 BLD-MCNC: 1699 PG/ML (ref 211–946)

## 2017-11-10 PROCEDURE — 36415 COLL VENOUS BLD VENIPUNCTURE: CPT | Performed by: INTERNAL MEDICINE

## 2017-11-10 PROCEDURE — 82378 CARCINOEMBRYONIC ANTIGEN: CPT | Performed by: INTERNAL MEDICINE

## 2017-11-10 PROCEDURE — 99214 OFFICE O/P EST MOD 30 MIN: CPT | Performed by: INTERNAL MEDICINE

## 2017-11-10 PROCEDURE — 82607 VITAMIN B-12: CPT | Performed by: INTERNAL MEDICINE

## 2017-11-10 NOTE — PROGRESS NOTES
Subjective    REASONS FOR FOLLOW-UP :  1.  Rectal cancer T3N1M0 treated with neoadjuvant chemoradiation  2. ypT3N0 at surgery 5/17 with a positive radial margin reexcised and treated with radiation boost to the right pelvic sidewall postoperatively  3.  Adjuvant FOLFOX 6 x 8 completed in 10/16  4.  Additional surgery in 11/16 with colostomy reversal  5.  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  Unexplained small bilateral pleural effusions first noted in 5/17 not seen in 11/16 CAT scan of the chest resolved in 10/17    History of Present Illness    Mr. Viera is a 51-year-old man returning today for follow-up for rectal cancer.  Genetic testing was completed and consistent with Mathias syndrome with a PMS 2 mutation which is associated with lower risk of subsequent colon cancer then the MLH1 and 2 mutations  .  He is here to review his CAT scans of the chest because of the small nondisplaced pleural effusions and fortunately they have resolved and there is no signs of cancer on scans of the chest abdomen pelvis.  CEA is normal at 1.44 and B12 was also in the high normal range at 1699.    His next colonoscopy is due in December and I suggested an upper endoscopy too because of his Mathias syndrome which can be associated with upper GI polyps.  He is taking 600 mg of aspirin a day for prevention of polyps which was recommended by Dr. Aguilera    Active Ambulatory Problems     Diagnosis Date Noted   • Rectal cancer 03/30/2016   • B12 deficiency 10/18/2016     Resolved Ambulatory Problems     Diagnosis Date Noted   • No Resolved Ambulatory Problems     Past Medical History:   Diagnosis Date   • Anxiety    • Colon cancer 2016   • Difficulty in urination    • History of sleep apnea    • Ileostomy in place    • Seasonal allergies    • Urinary frequency    • Urinary incontinence      Past Surgical History:   Procedure Laterality Date   • COLONOSCOPY W/ BIOPSIES N/A 2016    Dr. Leavitt  [place]- MALIGNANT    • CYSTOSCOPY URETHROTOMY VISUAL INTERNAL N/A 7/28/2016    Procedure: dilation of uretheral stricture and catheter placement;  Surgeon: Richard Boston MD;  Location: [facility];  Service:    • FOOT SURGERY Left 1983    Benign cyst left foot   • MA INSJ TUNNELED CVC W/O SUBQ PORT/ AGE 7 YR/> Left 6/22/2016    Procedure: Power Port Placement;  Surgeon: Keely Bermudez MD;  Location: [facility];  Service: General   • SINUS SURGERY  1996   • TRANSANAL RECTAL RESECTION N/A 05/09/2016    Rectal resection and hand-sewn colon-pouch-anal anastomosis with splenic flexure mobilization, and clipping of right pelvic sidewall for postoperative radiation boost, omental pedicle, Dr. Emily Aguilera         Oncology/Hematology History    1.  Rectal cancer diagnosed January 2016 after presenting with a several month history of rectal bleeding, diarrhea and 10 lb weight loss; clinical stage T3N1M0  2.  Completed neoadjuvant Xeloda with radiation (5040cGy) 3/2/16  3. Surgery May 9 2016  pathology revealed a residual 5cm invasive mucinous adenocarcinoma, low grade, invading through the muscularis propria and into the perirectal adipose tissue with an initial positive radial margin. There was no evidence of lymphvascular space invasion and thirteen lymph nodes were negative for metastatic involvement. Additional distal and radial margin re-excision specimens were negative for carcinoma and excision of right pelvic lateral sidewall was negative for carcinoma. According to Dr. Aguilera's operative report, the rectum/rectal tumor was densely adhered to the right pelvic side wall and she placed clips in this area for possible adjuvant radiation boost. The initial frozen margins came back negative but due to her concern, she submitted additional right lateral and distal margins and according to the pathology report these additional specimens were negative for carcinoma. However, a comment on the  path report states that an permanent sections an additional section of tumor bed was taken, revealing invasive carcinoma that focally extended to the radial margin and this positive margin was within 5mm fo the area taken on frozen section and it is unclear whether the additional radial margin would have covered this; he completed a post-surgery radiation boost to the right pelvic sidewall  4.  Initiated adjuvant FOLFOX-6 6/29/16 and 8 cycles completed 10/18/16 with dose reductions secondary to neuropathy and diarrhea        Rectal cancer    1/19/2016 Initial Diagnosis     Rectal cancer        5/17   He was treated neoadjuvantly with Xeloda and radiation to a dose of 5040 cGy completed in early March 2016.  He then underwent surgical resection by Dr. Aguilera with final pathology showing a low-grade mucinous adenocarcinoma T3 N0 M0 with a questionable positive radial pelvic sidewall margin.  Surgical clips were placed in the area of concern in the pelvic sidewall, and he completed a radiation boost to this area.  The tumor had a regression score of 2 and histologic features suggestive of MSI; immunohistochemistry of tumor cells was positive for MLH-1, MSH-2, and MSH-6.  He completed 8 cycles of adjuvant FOLFOX 6/10/1816 with dose reductions for neuropathy and diarrhea.    Patient returns today to review his CAT scans and overall is doing fairly well.  He has no cough or shortness of breath weight and appetite are stable.  His CEA was 1.92 which is normal.  CAT scans show minimal small pleural effusions which were not seen in November prior to his second surgery.  There are very small and not tappable and I suspect there residual from his surgery and I will try and get some x-rays from [facility] if they were done postoperatively.  I recommended we repeat his chest CT in 3 months to make sure there is resolution and no progression of these findings and overall I'm not very worried that these  "are related to metastatic disease.    He has had blood drawn for genetic testing consistent with Mathias syndrome with a PMS 2 mutation and the VUS in the DICER gene      SOCIAL HISTORY: He is , has 1 daughter. He is currently employed as a . He has never smoked. Drinks 2-3 glasses of wine per month.  Denies illicit drug use or HIV risk factors.     FAMILY HISTORY: His maternal grandmother had colon cancer in her 70s. No other malignancies in the family. His father had heart disease, hypertension and diabetes.         Review of Systems   Constitutional: Negative for activity change, fatigue and unexpected weight change.   Respiratory: Negative for shortness of breath and wheezing.    Cardiovascular: Negative for chest pain and leg swelling.   Gastrointestinal: Negative for abdominal pain, diarrhea, nausea and rectal pain.   Musculoskeletal: Negative for arthralgias and back pain.   Skin: Negative for color change.   Neurological: Negative for dizziness and weakness.      A comprehensive 14 point review of systems was performed and was negative except as mentioned.    Medications:  The current medication list was reviewed in the EMR    ALLERGIES:  No Known Allergies    Objective      Vitals:    11/10/17 1012   BP: 118/86   Pulse: 59   Resp: 16   Temp: 97.9 °F (36.6 °C)   TempSrc: Oral   SpO2: 97%   Weight: 175 lb 6.4 oz (79.6 kg)   Height: 70.28\" (178.5 cm)   PainSc: 0-No pain     Current Status 11/10/2017   ECOG score 0       Physical Exam    GENERAL:  Well-developed, well-nourished in no acute distress.   SKIN:  Warm, dry without rashes, purpura or petechiae.  EYES:  Pupils equal, round and reactive to light.  EOMs intact.  Conjunctivae normal.  EARS:  Hearing intact.  NOSE:  Septum midline.  No excoriations or nasal discharge.  MOUTH:  Tongue is well-papillated; no stomatitis or ulcers.  Lips normal.  THROAT:  Oropharynx without lesions or exudates.  NECK:  Supple with good range of " motion; no thyromegaly or masses, no JVD.  LYMPHATICS:  No cervical, supraclavicular, axillary or inguinal adenopathy.  CHEST:  Lungs clear to auscultation. Good airflow.  CARDIAC:  Regular rate and rhythm without murmurs, rubs or gallops. Normal S1,S2.  ABDOMEN:  Not examined  EXTREMITIES:  No clubbing, cyanosis or edema.  NEUROLOGICAL:  Cranial Nerves II-XII grossly intact.  No focal neurological deficits.  PSYCHIATRIC:  Normal affect and mood.  '    RECENT LABS:  Hematology WBC   Date Value Ref Range Status   11/07/2017 4.12 4.00 - 10.00 10*3/mm3 Final     RBC   Date Value Ref Range Status   11/07/2017 4.42 4.30 - 5.50 10*6/mm3 Final     Hemoglobin   Date Value Ref Range Status   11/07/2017 14.1 13.5 - 16.5 g/dL Final     Hematocrit   Date Value Ref Range Status   11/07/2017 41.1 40.0 - 49.0 % Final     Platelets   Date Value Ref Range Status   11/07/2017 227 150 - 375 10*3/mm3 Final     Lab Results   Component Value Date    GLUCOSE 99 11/07/2017    BUN 10 11/07/2017    CREATININE 0.90 11/07/2017    EGFRIFNONA 90 11/07/2017    EGFRIFAFRI  09/20/2016      Comment:      <15 Indicative of kidney failure.    BCR 11.2 11/07/2017    K 4.4 11/07/2017    CO2 28.3 11/07/2017    CALCIUM 9.3 11/07/2017    ALBUMIN 4.20 11/07/2017    LABIL2 1.6 11/07/2017    AST 17 11/07/2017    ALT 19 11/07/2017     cea 1.44     B12 1699    Ct CAP  IMPRESSION:  There is postsurgical change in the pelvis from previous  bowel resection at the rectum. No recurrent or metastatic disease is  identified in the chest, abdomen, or pelvis.          This report was finalized on 11/8/2017          Assessment/Plan   1.t neoadjuvant chemoradiation for a clinical T3 N1 M0 rectal cancer.  At the time of surgery final pathology showed YT3 N0 M0 disease with 13 negative lymph nodes.  There was a questionable positive radial margin involving the right pelvic sidewall and he completed a radiation boost to this area. He completed 8 cycles of FOLFOX 6 with dose  reductions in October 2016.    2.  Reversal of colostomy in 11/16  3.  Follow-up CAT scans and 5/17 showed bilateral very small pleural effusions of unknown etiology-Resolved in 11/17  4. mild peripheral neuropathy   5. Mathias syndrome PMS 2 gene mutation On aspirin 650 mg    Plan  Return in 6 months with CEA and CT of the chest  Repeat colonoscopy +egd in November   We will continue scanning every 6 months through 5 years and check CEAs every 3 months through year 3            11/10/2017      CC:

## 2017-11-13 ENCOUNTER — TELEPHONE (OUTPATIENT)
Dept: ONCOLOGY | Facility: HOSPITAL | Age: 52
End: 2017-11-13

## 2017-11-13 NOTE — TELEPHONE ENCOUNTER
Spoke with pt regarding B12 level. Informed him that yes B12 is high but we are waiting on MMA level to result. Once that has resulted we will call him and let him know if he needs to stop taking B12. Pt v/u and will call back on Wednesday if he has not heard from us    ----- Message from Dane Viera sent at 11/13/2017  2:57 PM EST -----  Regarding: RE: Visit Follow-Up Question  Contact: [phone]  Diamond,    Would you be able to call me at [phone] for a few minutes. This has me a bit concerned and I would like to ask a few questions, please.    Thanks,    Tree Viera  ----- Message -----  From: LANCE PHAN  Sent: 11/13/2017  2:47 PM EST  To: Dane Viera  Subject: RE: Visit Follow-Up Question    Dane,   I apologize for the delay in my response. Your Vitamin B12 level is on the higher side but we are still waiting on a lab called Methylmalonic Acid (MMA) to come back. This test was sent to [facility] and is still not back. If this level is low it can indicate Vitamin B12 deficiency. Please check back with us in a few days for the result of MMA. Thank you  Diamond LUCAS    ----- Message -----     From: Dane Viera     Sent: 11/11/2017 10:48 PM EST       To: Rakesh Pardo MD  Subject: Visit Follow-Up Question    Dr. Pardo,    I just saw my B12 result and it looks as if it well exceed the upper limit of standard range. Can you advise me on this.    Thanks,    Tree

## 2017-11-14 ENCOUNTER — TELEPHONE (OUTPATIENT)
Dept: ONCOLOGY | Facility: CLINIC | Age: 52
End: 2017-11-14

## 2017-11-14 LAB — METHYLMALONATE SERPL-SCNC: 84 NMOL/L (ref 0–378)

## 2017-11-14 NOTE — TELEPHONE ENCOUNTER
Per can in lab, pt. mma will not be back until Thursday of this week.  Informed Diamond PHAN of this.

## 2017-11-17 ENCOUNTER — TELEPHONE (OUTPATIENT)
Dept: ONCOLOGY | Facility: HOSPITAL | Age: 52
End: 2017-11-17

## 2017-11-20 ENCOUNTER — TELEPHONE (OUTPATIENT)
Dept: ONCOLOGY | Facility: HOSPITAL | Age: 52
End: 2017-11-20

## 2017-11-20 NOTE — TELEPHONE ENCOUNTER
Called and left VM with instructions to stop taking B12    ----- Message from Ruma Macias RN sent at 11/20/2017  8:04 AM EST -----      ----- Message -----     From: Rakesh Pardo MD     Sent: 11/16/2017   5:31 PM       To: Ruma Macias RN    Nothing to be done -dont need to take B12 if he is taking it  gj  ----- Message -----     From: Ruma Macias RN     Sent: 11/15/2017   5:06 PM       To: Rakesh Pardo MD    Pt sent pt advice request regarding MMA level and abnormal B12, his B12 level is elevated and MMA looks to be mid-range, please let us know what we can tell him about these levels, thank you

## 2017-11-22 RX ORDER — ESCITALOPRAM OXALATE 20 MG/1
TABLET ORAL
Qty: 30 TABLET | Refills: 5 | Status: SHIPPED | OUTPATIENT
Start: 2017-11-22 | End: 2018-05-21 | Stop reason: SDUPTHER

## 2017-12-29 ENCOUNTER — TELEPHONE (OUTPATIENT)
Dept: ONCOLOGY | Facility: HOSPITAL | Age: 52
End: 2017-12-29

## 2017-12-29 ENCOUNTER — HOSPITAL ENCOUNTER (EMERGENCY)
Facility: HOSPITAL | Age: 52
Discharge: HOME | End: 2017-12-29
Admitting: EMERGENCY MEDICINE

## 2017-12-29 ENCOUNTER — APPOINTMENT (OUTPATIENT)
Dept: GENERAL RADIOLOGY | Facility: HOSPITAL | Age: 52
End: 2017-12-29

## 2017-12-29 VITALS
DIASTOLIC BLOOD PRESSURE: 95 MMHG | SYSTOLIC BLOOD PRESSURE: 131 MMHG | WEIGHT: 175 LBS | HEIGHT: 70 IN | OXYGEN SATURATION: 99 % | TEMPERATURE: 96.9 F | RESPIRATION RATE: 18 BRPM | HEART RATE: 71 BPM | BODY MASS INDEX: 25.05 KG/M2

## 2017-12-29 DIAGNOSIS — R07.89 ATYPICAL CHEST PAIN: Primary | ICD-10-CM

## 2017-12-29 LAB
ALBUMIN SERPL-MCNC: 4.4 G/DL (ref 3.5–5.2)
ALBUMIN/GLOB SERPL: 1.5 G/DL
ALP SERPL-CCNC: 72 U/L (ref 39–117)
ALT SERPL W P-5'-P-CCNC: 30 U/L (ref 1–41)
ANION GAP SERPL CALCULATED.3IONS-SCNC: 10.9 MMOL/L
AST SERPL-CCNC: 22 U/L (ref 1–40)
BASOPHILS # BLD AUTO: 0.03 10*3/MM3 (ref 0–0.2)
BASOPHILS NFR BLD AUTO: 0.6 % (ref 0–1.5)
BILIRUB SERPL-MCNC: 0.7 MG/DL (ref 0.1–1.2)
BUN BLD-MCNC: 10 MG/DL (ref 6–20)
BUN/CREAT SERPL: 11 (ref 7–25)
CALCIUM SPEC-SCNC: 9.3 MG/DL (ref 8.6–10.5)
CHLORIDE SERPL-SCNC: 103 MMOL/L (ref 98–107)
CO2 SERPL-SCNC: 29.1 MMOL/L (ref 22–29)
CREAT BLD-MCNC: 0.91 MG/DL (ref 0.76–1.27)
DEPRECATED RDW RBC AUTO: 42.2 FL (ref 37–54)
EOSINOPHIL # BLD AUTO: 0.1 10*3/MM3 (ref 0–0.7)
EOSINOPHIL NFR BLD AUTO: 2.1 % (ref 0.3–6.2)
ERYTHROCYTE [DISTWIDTH] IN BLOOD BY AUTOMATED COUNT: 12.5 % (ref 11.5–14.5)
GFR SERPL CREATININE-BSD FRML MDRD: 88 ML/MIN/1.73
GLOBULIN UR ELPH-MCNC: 3 GM/DL
GLUCOSE BLD-MCNC: 87 MG/DL (ref 65–99)
HCT VFR BLD AUTO: 42.7 % (ref 40.4–52.2)
HGB BLD-MCNC: 14.6 G/DL (ref 13.7–17.6)
HOLD SPECIMEN: NORMAL
HOLD SPECIMEN: NORMAL
IMM GRANULOCYTES # BLD: 0.02 10*3/MM3 (ref 0–0.03)
IMM GRANULOCYTES NFR BLD: 0.4 % (ref 0–0.5)
LYMPHOCYTES # BLD AUTO: 1.41 10*3/MM3 (ref 0.9–4.8)
LYMPHOCYTES NFR BLD AUTO: 29.6 % (ref 19.6–45.3)
MCH RBC QN AUTO: 31.8 PG (ref 27–32.7)
MCHC RBC AUTO-ENTMCNC: 34.2 G/DL (ref 32.6–36.4)
MCV RBC AUTO: 93 FL (ref 79.8–96.2)
MONOCYTES # BLD AUTO: 0.38 10*3/MM3 (ref 0.2–1.2)
MONOCYTES NFR BLD AUTO: 8 % (ref 5–12)
NEUTROPHILS # BLD AUTO: 2.83 10*3/MM3 (ref 1.9–8.1)
NEUTROPHILS NFR BLD AUTO: 59.3 % (ref 42.7–76)
PLATELET # BLD AUTO: 209 10*3/MM3 (ref 140–500)
PMV BLD AUTO: 9.2 FL (ref 6–12)
POTASSIUM BLD-SCNC: 4.6 MMOL/L (ref 3.5–5.2)
PROT SERPL-MCNC: 7.4 G/DL (ref 6–8.5)
RBC # BLD AUTO: 4.59 10*6/MM3 (ref 4.6–6)
SODIUM BLD-SCNC: 143 MMOL/L (ref 136–145)
TROPONIN T SERPL-MCNC: <0.01 NG/ML (ref 0–0.03)
WBC NRBC COR # BLD: 4.77 10*3/MM3 (ref 4.5–10.7)
WHOLE BLOOD HOLD SPECIMEN: NORMAL
WHOLE BLOOD HOLD SPECIMEN: NORMAL

## 2017-12-29 PROCEDURE — 93010 ELECTROCARDIOGRAM REPORT: CPT | Performed by: INTERNAL MEDICINE

## 2017-12-29 PROCEDURE — 71020 HC CHEST PA AND LATERAL: CPT

## 2017-12-29 PROCEDURE — 80053 COMPREHEN METABOLIC PANEL: CPT

## 2017-12-29 PROCEDURE — 84484 ASSAY OF TROPONIN QUANT: CPT

## 2017-12-29 PROCEDURE — 36415 COLL VENOUS BLD VENIPUNCTURE: CPT

## 2017-12-29 PROCEDURE — 85025 COMPLETE CBC W/AUTO DIFF WBC: CPT

## 2017-12-29 PROCEDURE — 99283 EMERGENCY DEPT VISIT LOW MDM: CPT

## 2017-12-29 PROCEDURE — 93005 ELECTROCARDIOGRAM TRACING: CPT

## 2017-12-29 RX ORDER — CITALOPRAM 20 MG/1
20 TABLET ORAL DAILY
COMMUNITY
End: 2018-01-08

## 2017-12-29 RX ORDER — ASPIRIN 325 MG
325 TABLET ORAL ONCE
Status: DISCONTINUED | OUTPATIENT
Start: 2017-12-29 | End: 2017-12-29 | Stop reason: HOSPADM

## 2017-12-29 RX ORDER — SODIUM CHLORIDE 0.9 % (FLUSH) 0.9 %
10 SYRINGE (ML) INJECTION AS NEEDED
Status: DISCONTINUED | OUTPATIENT
Start: 2017-12-29 | End: 2017-12-29 | Stop reason: HOSPADM

## 2018-01-08 PROCEDURE — 93000 ELECTROCARDIOGRAM COMPLETE: CPT | Performed by: NURSE PRACTITIONER

## 2018-01-10 ENCOUNTER — OFFICE VISIT (OUTPATIENT)
Dept: CARDIOLOGY | Facility: CLINIC | Age: 53
End: 2018-01-10

## 2018-01-10 VITALS
BODY MASS INDEX: 25.22 KG/M2 | SYSTOLIC BLOOD PRESSURE: 120 MMHG | HEART RATE: 61 BPM | WEIGHT: 176.2 LBS | HEIGHT: 70 IN | DIASTOLIC BLOOD PRESSURE: 80 MMHG

## 2018-01-10 DIAGNOSIS — Z82.49 FAMILY HISTORY OF PREMATURE CAD: ICD-10-CM

## 2018-01-10 DIAGNOSIS — R07.2 PRECORDIAL PAIN: Primary | ICD-10-CM

## 2018-01-10 PROCEDURE — 99214 OFFICE O/P EST MOD 30 MIN: CPT | Performed by: NURSE PRACTITIONER

## 2018-01-10 NOTE — PROGRESS NOTES
Date of Office Visit: 01/10/2018  Encounter Provider: RAI Clifton  Place of Service: [facility] CARDIOLOGY  Patient Name: Dane Viera  :1965    Chief Complaint   Patient presents with   • Chest Pain   :     HPI: Dane Viera is a 52 y.o. male who presents today for evaluation of chest pain.  He is a new patient to me and his previous records have been reviewed.  He has a past medical history of rectal cancer and B12 deficiency.He denies a history of coronary artery disease heart failure, hypertension, hyperlipidemia, or diabetes.  His father was diagnosed with coronary artery disease at age 48 and he has really tried to live a healthy lifestyle.    He presented to the [facility] emergency department on 17 with chest pain that varied in location and he described this as an ache.  He noticed the pain more in the evenings and has been going on for about 5 days before going to the ED.  The pain did not worsen with exertion and had no other associated symptoms.  He did report that his father had a heart attack at age 48.  He denied any history of coronary artery disease, hypertension, hyperlipidemia, diabetes, or smoking.  Upon review of the ED records, blood pressure is 126/81, heart rate 67, and he was afebrile.  Chest x-ray and EKG were both interpreted as normal.  CBC was normal except for RBC of 4.59, troponin normal, and CMP unremarkable.  He was recommended to follow-up in our office for further evaluation of symptoms.    He presents today for further evaluation.  He explains he had an endoscopy on  and then his symptoms of chest pain started on the .  He has continued to experience chest pain and which she describes a pinching and burning sensation that he rates a 1 out of 10.  He denies any associated symptoms except for fatigue.  He further denies shortness of air, paroxysmal nocturnal dyspnea, orthopnea, cough, edema, palpitations,  "dizziness, or syncope.    The following portion of the patient's history were reviewed and updated as appropriate: past medical history, past surgical history, past social history, past family history, allergies, current medications, and problem list.    Past Medical History:   Diagnosis Date   • Anxiety    • Chest pain    • Colon cancer 2016    Large Low Rectal Cancer with invasion to right pelvic sidewall   • Difficulty in urination     \"EACH DAY GETTING WORSE....I MAY HAVE TO GO TO E.R. BEFORE SURG IF I NEED TO\"   • History of sleep apnea     CANT WEAR MACHINE   • Ileostomy in place     TEMPORARY  ....SURGERY MAY 9 2016     • Rectal cancer 03/30/2016   • Seasonal allergies    • Urinary frequency    • Urinary incontinence     \"GETTING WORSE\"   • Vitamin B 12 deficiency 10/18/2016       Past Surgical History:   Procedure Laterality Date   • COLONOSCOPY W/ BIOPSIES N/A 2016    Dr. Tree Avila- MALIGNANT    • CYSTOSCOPY URETHROTOMY VISUAL INTERNAL N/A 7/28/2016    Procedure: dilation of uretheral stricture and catheter placement;  Surgeon: Richard Boston MD;  Location: [facility];  Service:    • FOOT SURGERY Left 1983    Benign cyst left foot   • MS INSJ TUNNELED CVC W/O SUBQ PORT/ AGE 7 YR/> Left 6/22/2016    Procedure: Power Port Placement;  Surgeon: Keely Bermudez MD;  Location: [facility];  Service: General   • SINUS SURGERY  1996   • TRANSANAL RECTAL RESECTION N/A 05/09/2016    Rectal resection and hand-sewn colon-pouch-anal anastomosis with splenic flexure mobilization, and clipping of right pelvic sidewall for postoperative radiation boost, omental pedicle, Dr. Emily Aguilera       Social History     Social History   • Marital status:      Spouse name: N/A   • Number of children: N/A   • Years of education: College     Occupational History   •  Unemployed     Previously in professional business roles     Social History Main Topics   • Smoking status: Never Smoker   • Smokeless " tobacco: Never Used      Comment: Daily caffeine use   • Alcohol use No      Comment: Occasional   • Drug use: No   • Sexual activity: Not on file     Other Topics Concern   • Not on file     Social History Narrative    Previously worked in various professional consulting roles. Has 1 daughter.        Family History   Problem Relation Age of Onset   • Colon cancer Maternal Grandmother 70   • Heart disease Father      first heart attack age 48   • Hypertension Father    • Diabetes Father        Review of Systems   Constitution: Positive for malaise/fatigue. Negative for chills, diaphoresis, fever, night sweats, weight gain and weight loss.   HENT: Negative for hearing loss, nosebleeds, sore throat and tinnitus.    Eyes: Negative for blurred vision, double vision, pain and visual disturbance.   Cardiovascular: Positive for chest pain. Negative for claudication, cyanosis, dyspnea on exertion, irregular heartbeat, leg swelling, near-syncope, orthopnea, palpitations, paroxysmal nocturnal dyspnea and syncope.   Respiratory: Negative for cough, hemoptysis, shortness of breath, snoring and wheezing.    Endocrine: Negative for cold intolerance, heat intolerance and polyuria.   Hematologic/Lymphatic: Negative for bleeding problem. Does not bruise/bleed easily.   Skin: Negative for color change, dry skin, flushing and itching.   Musculoskeletal: Negative for falls, joint pain, joint swelling, muscle cramps, muscle weakness and myalgias.   Gastrointestinal: Negative for abdominal pain, constipation, heartburn, melena, nausea and vomiting.   Genitourinary: Negative for dysuria and hematuria.   Neurological: Negative for excessive daytime sleepiness, dizziness, light-headedness, loss of balance, numbness, paresthesias, seizures and vertigo.   Psychiatric/Behavioral: Negative for altered mental status, depression, memory loss and substance abuse. The patient does not have insomnia and is not nervous/anxious.   "  Allergic/Immunologic: Negative for environmental allergies.       No Known Allergies      Current Outpatient Prescriptions:   •  COATED ASPIRIN PO, Take 325 mg by mouth 2 (Two) Times a Day., Disp: , Rfl:   •  escitalopram (LEXAPRO) 20 MG tablet, TAKE 1 TABLET BY MOUTH DAILY., Disp: 30 tablet, Rfl: 5     Objective:     Vitals:    01/10/18 1123   BP: 120/80   Pulse: 61   Weight: 79.9 kg (176 lb 3.2 oz)   Height: 177.8 cm (70\")     Body mass index is 25.28 kg/(m^2).    PHYSICAL EXAM:    Vitals Reviewed.   General Appearance: No acute distress, well developed and well nourished.   Eyes: Conjunctiva and lids: No erythema, swelling, or discharge. Sclera non-icteric.   HENT: Atraumatic, normocephalic. External eyes, ears, and nose normal. No hearing loss noted. Mucous membranes normal. Lips not cyanotic. Neck supple with no tenderness.  Respiratory: No signs of respiratory distress. Respiration rhythm and depth normal.   Clear to auscultation. No rales, crackles, rhonchi, or wheezing auscultated.   Cardiovascular:  Jugular Venous Pressure: Normal  Heart Rate and Rhythm: Normal, Heart Sounds: Normal S1 and S2. No S3 or S4 noted.  Murmurs: No murmurs noted. No rubs, thrills, or gallops.   Arterial Pulses: Carotid pulses normal. No carotid bruit noted. Posterior tibialis and dorsalis pedis pulses normal.   Lower Extremities: No edema noted.  Gastrointestinal:  Abdomen soft, non-distended, non-tender. Normal bowel sounds. No hepatomegaly.   Musculoskeletal: Normal movement of extremities  Skin and Nails: General appearance normal. No pallor, cyanosis, diaphoresis. Skin temperature normal. No clubbing of fingernails.   Psychiatric: Patient alert and oriented to person, place, and time. Speech and behavior appropriate. Normal mood and affect.       ECG 12 Lead  Date/Time: 1/8/2018 1:20 PM  Performed by: ERIKA HUTCHINSON  Authorized by: ERIKA HUTCHINSON   Comparison: compared with previous ECG from 12/29/2017  Similar to " previous ECG  Rhythm: sinus rhythm  Rate: normal  BPM: 61  Conduction: conduction normal  ST Segments: ST segments normal  T Waves: T waves normal  QRS axis: normal  Other: no other findings  Clinical impression: normal ECG              Assessment:       Diagnosis Plan   1. Precordial pain  CT cardiac calcium score wo dye   2. Family history of premature CAD  CT cardiac calcium score wo dye          Plan:       Mr. Viera Presents today for evaluation of chest pain.  He does not have any risk factors.  His father was diagnosed with coronary artery disease at age 48.  He had many questions regarding LPA and CRP screening testing.  Dr. Yassine Zuñiga and I discussed with the patient that there is not validity in those screening tests.  We have recommended a CT cardiac calcium score to be completed and explained that insurance does not pay for this test.  If he does have a significant amount of calcium and we may proceed with a stress test.  His chest pain is also been somewhat noncardiac in origin as it's been going on for about 20 days and does not worsen with exertion, nor resolve with rest.  His chest pain occurred a couple days after having an endoscopy and it may just be secondary to that.  He verbalized understanding.    ADDENDUM: Dr. Yassine Zuñiga has recommended patient a fasting lipid panel completed.  He has not had that done in the past year.  I have placed the order and recommended that he do so at [facility] lab.    As always, it has been a pleasure to participate in your patient's care.      Sincerely,         RAI Arrieta

## 2018-01-11 ENCOUNTER — TELEPHONE (OUTPATIENT)
Dept: CARDIOLOGY | Facility: CLINIC | Age: 53
End: 2018-01-11

## 2018-01-11 NOTE — TELEPHONE ENCOUNTER
Amanda informed me that after speaking with Dr. Zuñiga, patient should have a fasting lipid panel if he has not had this done in the last year.  I informed pt.  He will get this done on Tuesday when he also gets his calcium scan done at [facility].  Maria Fernanda

## 2018-01-16 ENCOUNTER — TELEPHONE (OUTPATIENT)
Dept: CARDIOLOGY | Facility: CLINIC | Age: 53
End: 2018-01-16

## 2018-01-16 ENCOUNTER — HOSPITAL ENCOUNTER (OUTPATIENT)
Dept: CT IMAGING | Facility: HOSPITAL | Age: 53
Discharge: HOME | End: 2018-01-16
Admitting: NURSE PRACTITIONER

## 2018-01-16 PROCEDURE — 75571 CT HRT W/O DYE W/CA TEST: CPT

## 2018-01-16 NOTE — TELEPHONE ENCOUNTER
Patient informed about normal coronary CT screening test. I have recommended follow up with PCP about chest pain. He is going to have the lipids panel testing done.

## 2018-01-22 ENCOUNTER — LAB (OUTPATIENT)
Dept: LAB | Facility: HOSPITAL | Age: 53
End: 2018-01-22

## 2018-01-22 DIAGNOSIS — Z82.49 FAMILY HISTORY OF PREMATURE CAD: ICD-10-CM

## 2018-01-22 DIAGNOSIS — R07.2 PRECORDIAL PAIN: ICD-10-CM

## 2018-01-22 LAB
CHOLEST SERPL-MCNC: 234 MG/DL (ref 0–200)
HDLC SERPL-MCNC: 63 MG/DL (ref 40–60)
LDLC SERPL CALC-MCNC: 152 MG/DL (ref 0–100)
LDLC/HDLC SERPL: 2.42 {RATIO}
TRIGL SERPL-MCNC: 94 MG/DL (ref 0–150)
VLDLC SERPL-MCNC: 18.8 MG/DL (ref 5–40)

## 2018-01-22 PROCEDURE — 80061 LIPID PANEL: CPT

## 2018-04-05 ENCOUNTER — TELEPHONE (OUTPATIENT)
Dept: ONCOLOGY | Facility: CLINIC | Age: 53
End: 2018-04-05

## 2018-04-05 NOTE — TELEPHONE ENCOUNTER
Patient calling to confirm CT chest will be done at next CT appt. Order is in for CT c/a/p. Notified patient of this and he v/u

## 2018-04-06 ENCOUNTER — LAB (OUTPATIENT)
Dept: LAB | Facility: HOSPITAL | Age: 53
End: 2018-04-06

## 2018-04-06 ENCOUNTER — HOSPITAL ENCOUNTER (OUTPATIENT)
Dept: PET IMAGING | Facility: HOSPITAL | Age: 53
Discharge: HOME | End: 2018-04-06
Attending: INTERNAL MEDICINE | Admitting: INTERNAL MEDICINE

## 2018-04-06 DIAGNOSIS — C20 RECTAL CANCER (HCC): ICD-10-CM

## 2018-04-06 LAB
ALBUMIN SERPL-MCNC: 4.6 G/DL (ref 3.5–5.2)
ALBUMIN/GLOB SERPL: 1.7 G/DL (ref 1.1–2.4)
ALP SERPL-CCNC: 72 U/L (ref 38–116)
ALT SERPL W P-5'-P-CCNC: 39 U/L (ref 0–41)
ANION GAP SERPL CALCULATED.3IONS-SCNC: 14.5 MMOL/L
AST SERPL-CCNC: 29 U/L (ref 0–40)
BASOPHILS # BLD AUTO: 0.06 10*3/MM3 (ref 0–0.1)
BASOPHILS NFR BLD AUTO: 1.2 % (ref 0–1.1)
BILIRUB SERPL-MCNC: 0.7 MG/DL (ref 0.1–1.2)
BUN BLD-MCNC: 16 MG/DL (ref 6–20)
BUN/CREAT SERPL: 17.4 (ref 7.3–30)
CALCIUM SPEC-SCNC: 9.6 MG/DL (ref 8.5–10.2)
CEA SERPL-MCNC: 1.97 NG/ML
CHLORIDE SERPL-SCNC: 99 MMOL/L (ref 98–107)
CO2 SERPL-SCNC: 28.5 MMOL/L (ref 22–29)
CREAT BLD-MCNC: 0.92 MG/DL (ref 0.7–1.3)
CREAT BLDA-MCNC: 1 MG/DL (ref 0.6–1.3)
DEPRECATED RDW RBC AUTO: 42.3 FL (ref 37–49)
EOSINOPHIL # BLD AUTO: 0.15 10*3/MM3 (ref 0–0.36)
EOSINOPHIL NFR BLD AUTO: 3 % (ref 1–5)
ERYTHROCYTE [DISTWIDTH] IN BLOOD BY AUTOMATED COUNT: 12.3 % (ref 11.7–14.5)
GFR SERPL CREATININE-BSD FRML MDRD: 87 ML/MIN/1.73
GLOBULIN UR ELPH-MCNC: 2.7 GM/DL (ref 1.8–3.5)
GLUCOSE BLD-MCNC: 72 MG/DL (ref 74–124)
HCT VFR BLD AUTO: 43.7 % (ref 40–49)
HGB BLD-MCNC: 14.7 G/DL (ref 13.5–16.5)
IMM GRANULOCYTES # BLD: 0.01 10*3/MM3 (ref 0–0.03)
IMM GRANULOCYTES NFR BLD: 0.2 % (ref 0–0.5)
LYMPHOCYTES # BLD AUTO: 1.77 10*3/MM3 (ref 1–3.5)
LYMPHOCYTES NFR BLD AUTO: 35.3 % (ref 20–49)
MCH RBC QN AUTO: 31.5 PG (ref 27–33)
MCHC RBC AUTO-ENTMCNC: 33.6 G/DL (ref 32–35)
MCV RBC AUTO: 93.6 FL (ref 83–97)
MONOCYTES # BLD AUTO: 0.48 10*3/MM3 (ref 0.25–0.8)
MONOCYTES NFR BLD AUTO: 9.6 % (ref 4–12)
NEUTROPHILS # BLD AUTO: 2.55 10*3/MM3 (ref 1.5–7)
NEUTROPHILS NFR BLD AUTO: 50.7 % (ref 39–75)
NRBC BLD MANUAL-RTO: 0 /100 WBC (ref 0–0)
PLATELET # BLD AUTO: 241 10*3/MM3 (ref 150–375)
PMV BLD AUTO: 9.6 FL (ref 8.9–12.1)
POTASSIUM BLD-SCNC: 3.8 MMOL/L (ref 3.5–4.7)
PROT SERPL-MCNC: 7.3 G/DL (ref 6.3–8)
RBC # BLD AUTO: 4.67 10*6/MM3 (ref 4.3–5.5)
SODIUM BLD-SCNC: 142 MMOL/L (ref 134–145)
WBC NRBC COR # BLD: 5.02 10*3/MM3 (ref 4–10)

## 2018-04-06 PROCEDURE — 85025 COMPLETE CBC W/AUTO DIFF WBC: CPT | Performed by: INTERNAL MEDICINE

## 2018-04-06 PROCEDURE — 71260 CT THORAX DX C+: CPT

## 2018-04-06 PROCEDURE — 82565 ASSAY OF CREATININE: CPT

## 2018-04-06 PROCEDURE — 36415 COLL VENOUS BLD VENIPUNCTURE: CPT | Performed by: INTERNAL MEDICINE

## 2018-04-06 PROCEDURE — 80053 COMPREHEN METABOLIC PANEL: CPT | Performed by: INTERNAL MEDICINE

## 2018-04-06 PROCEDURE — 82378 CARCINOEMBRYONIC ANTIGEN: CPT | Performed by: INTERNAL MEDICINE

## 2018-04-06 PROCEDURE — 74177 CT ABD & PELVIS W/CONTRAST: CPT

## 2018-04-06 PROCEDURE — 25010000002 IOPAMIDOL 61 % SOLUTION: Performed by: INTERNAL MEDICINE

## 2018-04-06 PROCEDURE — 0 DIATRIZOATE MEGLUMINE & SODIUM PER 1 ML: Performed by: INTERNAL MEDICINE

## 2018-04-06 RX ADMIN — IOPAMIDOL 85 ML: 612 INJECTION, SOLUTION INTRAVENOUS at 09:30

## 2018-04-06 RX ADMIN — DIATRIZOATE MEGLUMINE AND DIATRIZOATE SODIUM 30 ML: 660; 100 LIQUID ORAL; RECTAL at 08:18

## 2018-04-20 ENCOUNTER — TELEPHONE (OUTPATIENT)
Dept: ONCOLOGY | Facility: CLINIC | Age: 53
End: 2018-04-20

## 2018-04-20 ENCOUNTER — TELEPHONE (OUTPATIENT)
Dept: ONCOLOGY | Facility: HOSPITAL | Age: 53
End: 2018-04-20

## 2018-04-20 NOTE — TELEPHONE ENCOUNTER
Patient called wanting to see if his appt with Dr. Stanley could be moved up.  Reviewed with Dr. Stanley, she was frustrated that his appt had been scheduled for 1 month out from his ct scan.  She wants to see him next week.  Message sent to appt desk to call and schedule.

## 2018-04-24 ENCOUNTER — OFFICE VISIT (OUTPATIENT)
Dept: ONCOLOGY | Facility: CLINIC | Age: 53
End: 2018-04-24

## 2018-04-24 ENCOUNTER — APPOINTMENT (OUTPATIENT)
Dept: LAB | Facility: HOSPITAL | Age: 53
End: 2018-04-24

## 2018-04-24 VITALS
BODY MASS INDEX: 25.22 KG/M2 | WEIGHT: 176.2 LBS | TEMPERATURE: 97.4 F | SYSTOLIC BLOOD PRESSURE: 92 MMHG | DIASTOLIC BLOOD PRESSURE: 70 MMHG | RESPIRATION RATE: 16 BRPM | OXYGEN SATURATION: 98 % | HEART RATE: 61 BPM | HEIGHT: 70 IN

## 2018-04-24 DIAGNOSIS — C20 RECTAL CANCER (HCC): ICD-10-CM

## 2018-04-24 DIAGNOSIS — E53.8 B12 DEFICIENCY: Primary | ICD-10-CM

## 2018-04-24 PROCEDURE — G0463 HOSPITAL OUTPT CLINIC VISIT: HCPCS | Performed by: INTERNAL MEDICINE

## 2018-04-24 PROCEDURE — 99214 OFFICE O/P EST MOD 30 MIN: CPT | Performed by: INTERNAL MEDICINE

## 2018-04-24 NOTE — PROGRESS NOTES
Subjective    REASONS FOR FOLLOW-UP :  1.  Rectal cancer T3N1M0 treated with neoadjuvant chemoradiation  2. ypT3N0 at surgery 5/17 with a positive radial margin reexcised and treated with radiation boost to the right pelvic sidewall postoperatively  3.  Adjuvant FOLFOX 6 x 8 completed in 10/16  4.  Additional surgery in 11/16 with colostomy reversal  5.  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  Unexplained small bilateral pleural effusions first noted in 5/17 not seen in 11/16 CAT scan of the chest resolved in 10/17-recurred in 4/18    History of Present Illness    Mr. Viera is a 51-year-old man returning today for follow-up for rectal cancer.  Genetic testing was completed and consistent with Mathias syndrome with a PMS 2 mutation which is associated with lower risk of subsequent colon cancer then the MLH1 and 2 mutations  .  He is here to review his CAT scans and interestingly an unusually small bilateral pleural effusions have reaccumulated and I have no good cause for this.  The last time his effusions were present they resolve spontaneously.  The only new thing that has happened is a upper and lower endoscopy at the end of December but his had no other symptoms attributable to his small effusions .    He had seen Dr. Gadiel Mendez before he had no good explanation and I asked him to go back and see Dr. Mendez   His colonoscopy and EGD were unremarkable except for some upper GI changes probably from aspirin and no polyps.     he does have issues with obstructive sleep apnea and has not dealt and I wonder if this is in any way causing effusions although it seems unlikely.the CEA is 1.97 in stable and I don't think effusions are related to his colon cancer         Active Ambulatory Problems     Diagnosis Date Noted   • Rectal cancer 03/30/2016   • B12 deficiency 10/18/2016     Resolved Ambulatory Problems     Diagnosis Date Noted   • No Resolved Ambulatory Problems     Past  Medical History:   Diagnosis Date   • Anxiety    • Chest pain    • Colon cancer 2016   • Difficulty in urination    • History of sleep apnea    • Ileostomy in place    • Rectal cancer 03/30/2016   • Seasonal allergies    • Urinary frequency    • Urinary incontinence    • Vitamin B 12 deficiency 10/18/2016     Past Surgical History:   Procedure Laterality Date   • COLONOSCOPY W/ BIOPSIES N/A 2016    Dr. Tree Avila- MALIGNANT    • CYSTOSCOPY URETHROTOMY VISUAL INTERNAL N/A 7/28/2016    Procedure: dilation of uretheral stricture and catheter placement;  Surgeon: Richard Boston MD;  Location: [facility] OR;  Service:    • FOOT SURGERY Left 1983    Benign cyst left foot   • IA INSJ TUNNELED CVC W/O SUBQ PORT/ AGE 7 YR/> Left 6/22/2016    Procedure: Power Port Placement;  Surgeon: Keely Bermudez MD;  Location: [facility] OR;  Service: General   • SINUS SURGERY  1996   • TRANSANAL RECTAL RESECTION N/A 05/09/2016    Rectal resection and hand-sewn colon-pouch-anal anastomosis with splenic flexure mobilization, and clipping of right pelvic sidewall for postoperative radiation boost, omental pedicle, Dr. Emily Aguilera         Oncology/Hematology History    1.  Rectal cancer diagnosed January 2016 after presenting with a several month history of rectal bleeding, diarrhea and 10 lb weight loss; clinical stage T3N1M0  2.  Completed neoadjuvant Xeloda with radiation (5040cGy) 3/2/16  3. Surgery May 9 2016  pathology revealed a residual 5cm invasive mucinous adenocarcinoma, low grade, invading through the muscularis propria and into the perirectal adipose tissue with an initial positive radial margin. There was no evidence of lymphvascular space invasion and thirteen lymph nodes were negative for metastatic involvement. Additional distal and radial margin re-excision specimens were negative for carcinoma and excision of right pelvic lateral sidewall was negative for carcinoma. According to Dr. Aguilera's  operative report, the rectum/rectal tumor was densely adhered to the right pelvic side wall and she placed clips in this area for possible adjuvant radiation boost. The initial frozen margins came back negative but due to her concern, she submitted additional right lateral and distal margins and according to the pathology report these additional specimens were negative for carcinoma. However, a comment on the path report states that an permanent sections an additional section of tumor bed was taken, revealing invasive carcinoma that focally extended to the radial margin and this positive margin was within 5mm fo the area taken on frozen section and it is unclear whether the additional radial margin would have covered this; he completed a post-surgery radiation boost to the right pelvic sidewall  4.  Initiated adjuvant FOLFOX-6 6/29/16 and 8 cycles completed 10/18/16 with dose reductions secondary to neuropathy and diarrhea        Rectal cancer    1/19/2016 Initial Diagnosis     Rectal cancer        5/17   He was treated neoadjuvantly with Xeloda and radiation to a dose of 5040 cGy completed in early March 2016.  He then underwent surgical resection by Dr. Aguilera with final pathology showing a low-grade mucinous adenocarcinoma T3 N0 M0 with a questionable positive radial pelvic sidewall margin.  Surgical clips were placed in the area of concern in the pelvic sidewall, and he completed a radiation boost to this area.  The tumor had a regression score of 2 and histologic features suggestive of MSI; immunohistochemistry of tumor cells was positive for MLH-1, MSH-2, and MSH-6.  He completed 8 cycles of adjuvant FOLFOX 6/10/1816 with dose reductions for neuropathy and diarrhea.    Patient returns today to review his CAT scans and overall is doing fairly well.  He has no cough or shortness of breath weight and appetite are stable.  His CEA was 1.92 which is normal.  CAT scans show minimal small pleural effusions which  "were not seen in November prior to his second surgery.  There are very small and not tappable and I suspect there residual from his surgery and I will try and get some x-[facility] from [facility] if they were done postoperatively.  I recommended we repeat his chest CT in 3 months to make sure there is resolution and no progression of these findings and overall I'm not very worried that these are related to metastatic disease.    He has had blood drawn for genetic testing consistent with Mathias syndrome with a PMS 2 mutation and the VUS in the DICER gene      SOCIAL HISTORY: He is , has 1 daughter. He is currently employed as a . He has never smoked. Drinks 2-3 glasses of wine per month.  Denies illicit drug use or HIV risk factors.     FAMILY HISTORY: His maternal grandmother had colon cancer in her 70s. No other malignancies in the family. His father had heart disease, hypertension and diabetes.         Review of Systems   Constitutional: Negative for activity change, fatigue and unexpected weight change.   Respiratory: Negative for shortness of breath and wheezing.    Cardiovascular: Negative for chest pain and leg swelling.   Gastrointestinal: Negative for abdominal pain, diarrhea, nausea and rectal pain.   Musculoskeletal: Negative for arthralgias and back pain.   Skin: Negative for color change.   Neurological: Negative for dizziness and weakness.      A comprehensive 14 point review of systems was performed and was negative except as mentioned.    Medications:  The current medication list was reviewed in the EMR    ALLERGIES:  No Known Allergies    Objective      Vitals:    04/24/18 1013   BP: 92/70   Pulse: 61   Resp: 16   Temp: 97.4 °F (36.3 °C)   TempSrc: Oral   SpO2: 98%   Weight: 79.9 kg (176 lb 3.2 oz)   Height: 177.8 cm (70\")   PainSc: 0-No pain     Current Status 4/24/2018   ECOG score 0       Physical Exam    GENERAL:  Well-developed, well-nourished in no " acute distress.   SKIN:  Warm, dry without rashes, purpura or petechiae.  EYES:  Pupils equal, round and reactive to light.  EOMs intact.  Conjunctivae normal.  EARS:  Hearing intact.  NOSE:  Septum midline.  No excoriations or nasal discharge.  MOUTH:  Tongue is well-papillated; no stomatitis or ulcers.  Lips normal.  THROAT:  Oropharynx without lesions or exudates.  NECK:  Supple with good range of motion; no thyromegaly or masses, no JVD.  LYMPHATICS:  No cervical, supraclavicular, axillary or inguinal adenopathy.  CHEST:  Lungs clear to auscultation. Good airflow.  CARDIAC:  Regular rate and rhythm without murmurs, rubs or gallops. Normal S1,S2.  ABDOMEN:  Not examined  EXTREMITIES:  No clubbing, cyanosis or edema.Eczema on both hands without any joint changes  NEUROLOGICAL:  Cranial Nerves II-XII grossly intact.  No focal neurological deficits.  PSYCHIATRIC:  Normal affect and mood.  '    RECENT LABS:  Hematology WBC   Date Value Ref Range Status   04/06/2018 5.02 4.00 - 10.00 10*3/mm3 Final     RBC   Date Value Ref Range Status   04/06/2018 4.67 4.30 - 5.50 10*6/mm3 Final     Hemoglobin   Date Value Ref Range Status   04/06/2018 14.7 13.5 - 16.5 g/dL Final     Hematocrit   Date Value Ref Range Status   04/06/2018 43.7 40.0 - 49.0 % Final     Platelets   Date Value Ref Range Status   04/06/2018 241 150 - 375 10*3/mm3 Final     Lab Results   Component Value Date    GLUCOSE 72 (L) 04/06/2018    BUN 16 04/06/2018    CREATININE 1.00 04/06/2018    EGFRIFNONA 87 04/06/2018    EGFRIFAFRI  09/20/2016      Comment:      <15 Indicative of kidney failure.    BCR 17.4 04/06/2018    K 3.8 04/06/2018    CO2 28.5 04/06/2018    CALCIUM 9.6 04/06/2018    ALBUMIN 4.60 04/06/2018    LABIL2 1.7 04/06/2018    AST 29 04/06/2018    ALT 39 04/06/2018     cea 1.44     B12 1699    Ct CAP  FINDINGS:  CHEST: There are no suspicious pulmonary opacities or nodules and there  is no lymphadenopathy within the chest. There are tiny bilateral  pleural  effusions which were marginal previously.     ABDOMEN/PELVIS: There is no suspicious liver lesion. The gallbladder,  spleen, pancreas, adrenals, and kidneys appear unremarkable other than a  tiny left renal cyst. No acute bowel abnormality is seen. There is no  suspicious nodularity at the primary anastomosis at the rectal bed.  There is no lymphadenopathy.     IMPRESSION:  1. There are tiny bilateral pleural effusions which are larger than  previously. There is otherwise no acute abnormality within the chest.  2. There is no evidence for metastatic disease within the abdomen or  pelvis and no new abnormality is seen.     This report was finalized on 4/10/2018            Assessment/Plan   1.t neoadjuvant chemoradiation for a clinical T3 N1 M0 rectal cancer.  At the time of surgery final pathology showed YT3 N0 M0 disease with 13 negative lymph nodes.  There was a questionable positive radial margin involving the right pelvic sidewall and he completed a radiation boost to this area. He completed 8 cycles of FOLFOX 6 with dose reductions in October 2016.    2.  Reversal of colostomy in 11/16  3.  Follow-up CAT scans and 5/17 showed bilateral very small pleural effusions of unknown etiology-Resolved in 11/17  4. mild peripheral neuropathy   5. Mathias syndrome PMS 2 gene mutation On aspirin 650 mg    Plan  Return in 6 months with CEA and CT of the chest  Check ESR CRP and MARYELLEN and thyroid profile will   Follow-up with pulmonary again   We will continue scanning every 6 months through 5 years and check CEAs every 3 months through year 3            4/24/2018      CC:

## 2018-05-01 ENCOUNTER — LAB (OUTPATIENT)
Dept: LAB | Facility: HOSPITAL | Age: 53
End: 2018-05-01

## 2018-05-01 DIAGNOSIS — C20 RECTAL CANCER (HCC): Primary | ICD-10-CM

## 2018-05-01 DIAGNOSIS — E53.8 B12 DEFICIENCY: ICD-10-CM

## 2018-05-01 LAB
BASOPHILS # BLD AUTO: 0.05 10*3/MM3 (ref 0–0.1)
BASOPHILS NFR BLD AUTO: 1.1 % (ref 0–1.1)
CRP SERPL-MCNC: <0.03 MG/DL (ref 0–0.5)
DEPRECATED RDW RBC AUTO: 40.8 FL (ref 37–49)
EOSINOPHIL # BLD AUTO: 0.11 10*3/MM3 (ref 0–0.36)
EOSINOPHIL NFR BLD AUTO: 2.4 % (ref 1–5)
ERYTHROCYTE [DISTWIDTH] IN BLOOD BY AUTOMATED COUNT: 12 % (ref 11.7–14.5)
ERYTHROCYTE [SEDIMENTATION RATE] IN BLOOD: 7 MM/HR (ref 0–20)
HCT VFR BLD AUTO: 42.7 % (ref 40–49)
HGB BLD-MCNC: 14.4 G/DL (ref 13.5–16.5)
IMM GRANULOCYTES # BLD: 0.01 10*3/MM3 (ref 0–0.03)
IMM GRANULOCYTES NFR BLD: 0.2 % (ref 0–0.5)
LYMPHOCYTES # BLD AUTO: 1.31 10*3/MM3 (ref 1–3.5)
LYMPHOCYTES NFR BLD AUTO: 28.1 % (ref 20–49)
MCH RBC QN AUTO: 31.4 PG (ref 27–33)
MCHC RBC AUTO-ENTMCNC: 33.7 G/DL (ref 32–35)
MCV RBC AUTO: 93 FL (ref 83–97)
MONOCYTES # BLD AUTO: 0.33 10*3/MM3 (ref 0.25–0.8)
MONOCYTES NFR BLD AUTO: 7.1 % (ref 4–12)
NEUTROPHILS # BLD AUTO: 2.86 10*3/MM3 (ref 1.5–7)
NEUTROPHILS NFR BLD AUTO: 61.1 % (ref 39–75)
NRBC BLD MANUAL-RTO: 0 /100 WBC (ref 0–0)
PLATELET # BLD AUTO: 218 10*3/MM3 (ref 150–375)
PMV BLD AUTO: 8.8 FL (ref 8.9–12.1)
RBC # BLD AUTO: 4.59 10*6/MM3 (ref 4.3–5.5)
T4 FREE SERPL-MCNC: 0.99 NG/DL (ref 0.93–1.7)
TSH SERPL DL<=0.05 MIU/L-ACNC: 0.43 MIU/ML (ref 0.27–4.2)
WBC NRBC COR # BLD: 4.67 10*3/MM3 (ref 4–10)

## 2018-05-01 PROCEDURE — 84443 ASSAY THYROID STIM HORMONE: CPT | Performed by: INTERNAL MEDICINE

## 2018-05-01 PROCEDURE — 84439 ASSAY OF FREE THYROXINE: CPT | Performed by: INTERNAL MEDICINE

## 2018-05-01 PROCEDURE — 86140 C-REACTIVE PROTEIN: CPT | Performed by: INTERNAL MEDICINE

## 2018-05-01 PROCEDURE — 36415 COLL VENOUS BLD VENIPUNCTURE: CPT | Performed by: INTERNAL MEDICINE

## 2018-05-01 PROCEDURE — 85652 RBC SED RATE AUTOMATED: CPT | Performed by: INTERNAL MEDICINE

## 2018-05-01 PROCEDURE — 85025 COMPLETE CBC W/AUTO DIFF WBC: CPT | Performed by: INTERNAL MEDICINE

## 2018-05-02 LAB — ANA SER QL: NEGATIVE

## 2018-05-22 RX ORDER — ESCITALOPRAM OXALATE 20 MG/1
TABLET ORAL
Qty: 30 TABLET | Refills: 5 | Status: SHIPPED | OUTPATIENT
Start: 2018-05-22 | End: 2018-11-19 | Stop reason: SDUPTHER

## 2018-09-25 ENCOUNTER — TELEPHONE (OUTPATIENT)
Dept: ONCOLOGY | Facility: HOSPITAL | Age: 53
End: 2018-09-25

## 2018-09-25 NOTE — TELEPHONE ENCOUNTER
Discussed with Kenisha in lab.  Kenisha to order lipid panel.  Patient called and message left on voicemail that lipid panel to be done and for him to remind the RN when he is here on the 2nd.

## 2018-09-25 NOTE — TELEPHONE ENCOUNTER
----- Message from Rakesh Pardo MD sent at 9/24/2018  5:42 PM EDT -----  Regarding: FW: Test Results Question  Contact: [phone]  Please add a lipid panel his labs when he comes to the office and tell the patient it is okay  ----- Message -----  From: Deepa Hill RN  Sent: 9/24/2018   8:07 AM  To: Rakesh Pardo MD  Subject: FW: Test Results Question                            ----- Message -----  From: Dane Viera  Sent: 9/21/2018   6:13 PM  To: [facility] Clinical Pool  Subject: Test Results Question                            Hi Dr. Pardo,    Is it possible for you to order a full Lipids Panel when I get the CT on October 2 given they are drawing samples that morning? I know this is is a test you have ordered previously though is one that I had done in January at [facility] and would like to see if there have been changes.    If it is feasible, it would be helpful for me.    Thank You

## 2018-10-02 ENCOUNTER — HOSPITAL ENCOUNTER (OUTPATIENT)
Dept: PET IMAGING | Facility: HOSPITAL | Age: 53
Discharge: HOME | End: 2018-10-02
Attending: INTERNAL MEDICINE | Admitting: INTERNAL MEDICINE

## 2018-10-02 ENCOUNTER — LAB (OUTPATIENT)
Dept: LAB | Facility: HOSPITAL | Age: 53
End: 2018-10-02

## 2018-10-02 DIAGNOSIS — E53.8 B12 DEFICIENCY: ICD-10-CM

## 2018-10-02 DIAGNOSIS — C20 RECTAL CANCER (HCC): ICD-10-CM

## 2018-10-02 LAB
ALBUMIN SERPL-MCNC: 4.5 G/DL (ref 3.5–5.2)
ALBUMIN/GLOB SERPL: 2 G/DL (ref 1.1–2.4)
ALP SERPL-CCNC: 70 U/L (ref 38–116)
ALT SERPL W P-5'-P-CCNC: 33 U/L (ref 0–41)
ANION GAP SERPL CALCULATED.3IONS-SCNC: 14.8 MMOL/L
AST SERPL-CCNC: 28 U/L (ref 0–40)
BASOPHILS # BLD AUTO: 0.06 10*3/MM3 (ref 0–0.1)
BASOPHILS NFR BLD AUTO: 1.1 % (ref 0–1.1)
BILIRUB SERPL-MCNC: 0.9 MG/DL (ref 0.1–1.2)
BUN BLD-MCNC: 12 MG/DL (ref 6–20)
BUN/CREAT SERPL: 13.3 (ref 7.3–30)
CALCIUM SPEC-SCNC: 9.1 MG/DL (ref 8.5–10.2)
CEA SERPL-MCNC: 1.55 NG/ML
CHLORIDE SERPL-SCNC: 100 MMOL/L (ref 98–107)
CHOLEST SERPL-MCNC: 227 MG/DL (ref 0–200)
CO2 SERPL-SCNC: 28.2 MMOL/L (ref 22–29)
CREAT BLD-MCNC: 0.9 MG/DL (ref 0.7–1.3)
CREAT BLDA-MCNC: 1 MG/DL (ref 0.6–1.3)
DEPRECATED RDW RBC AUTO: 41.9 FL (ref 37–49)
EOSINOPHIL # BLD AUTO: 0.14 10*3/MM3 (ref 0–0.36)
EOSINOPHIL NFR BLD AUTO: 2.7 % (ref 1–5)
ERYTHROCYTE [DISTWIDTH] IN BLOOD BY AUTOMATED COUNT: 12.2 % (ref 11.7–14.5)
GFR SERPL CREATININE-BSD FRML MDRD: 89 ML/MIN/1.73
GLOBULIN UR ELPH-MCNC: 2.3 GM/DL (ref 1.8–3.5)
GLUCOSE BLD-MCNC: 60 MG/DL (ref 74–124)
HCT VFR BLD AUTO: 43.8 % (ref 40–49)
HDLC SERPL-MCNC: 60 MG/DL (ref 40–60)
HGB BLD-MCNC: 14.8 G/DL (ref 13.5–16.5)
IMM GRANULOCYTES # BLD: 0.01 10*3/MM3 (ref 0–0.03)
IMM GRANULOCYTES NFR BLD: 0.2 % (ref 0–0.5)
LDLC SERPL CALC-MCNC: 130 MG/DL (ref 0–100)
LDLC/HDLC SERPL: 2.17 {RATIO}
LYMPHOCYTES # BLD AUTO: 1.99 10*3/MM3 (ref 1–3.5)
LYMPHOCYTES NFR BLD AUTO: 37.7 % (ref 20–49)
MCH RBC QN AUTO: 31.6 PG (ref 27–33)
MCHC RBC AUTO-ENTMCNC: 33.8 G/DL (ref 32–35)
MCV RBC AUTO: 93.4 FL (ref 83–97)
MONOCYTES # BLD AUTO: 0.52 10*3/MM3 (ref 0.25–0.8)
MONOCYTES NFR BLD AUTO: 9.8 % (ref 4–12)
NEUTROPHILS # BLD AUTO: 2.56 10*3/MM3 (ref 1.5–7)
NEUTROPHILS NFR BLD AUTO: 48.5 % (ref 39–75)
NRBC BLD MANUAL-RTO: 0 /100 WBC (ref 0–0)
PLATELET # BLD AUTO: 232 10*3/MM3 (ref 150–375)
PMV BLD AUTO: 9.6 FL (ref 8.9–12.1)
POTASSIUM BLD-SCNC: 3.4 MMOL/L (ref 3.5–4.7)
PROT SERPL-MCNC: 6.8 G/DL (ref 6.3–8)
RBC # BLD AUTO: 4.69 10*6/MM3 (ref 4.3–5.5)
SODIUM BLD-SCNC: 143 MMOL/L (ref 134–145)
TRIGL SERPL-MCNC: 184 MG/DL (ref 0–150)
VLDLC SERPL-MCNC: 36.8 MG/DL (ref 5–40)
WBC NRBC COR # BLD: 5.28 10*3/MM3 (ref 4–10)

## 2018-10-02 PROCEDURE — 71260 CT THORAX DX C+: CPT

## 2018-10-02 PROCEDURE — 82378 CARCINOEMBRYONIC ANTIGEN: CPT | Performed by: INTERNAL MEDICINE

## 2018-10-02 PROCEDURE — 85025 COMPLETE CBC W/AUTO DIFF WBC: CPT | Performed by: INTERNAL MEDICINE

## 2018-10-02 PROCEDURE — 36415 COLL VENOUS BLD VENIPUNCTURE: CPT | Performed by: INTERNAL MEDICINE

## 2018-10-02 PROCEDURE — 0 DIATRIZOATE MEGLUMINE & SODIUM PER 1 ML: Performed by: INTERNAL MEDICINE

## 2018-10-02 PROCEDURE — 74177 CT ABD & PELVIS W/CONTRAST: CPT

## 2018-10-02 PROCEDURE — 80053 COMPREHEN METABOLIC PANEL: CPT | Performed by: INTERNAL MEDICINE

## 2018-10-02 PROCEDURE — 80061 LIPID PANEL: CPT | Performed by: INTERNAL MEDICINE

## 2018-10-02 PROCEDURE — 82565 ASSAY OF CREATININE: CPT

## 2018-10-02 PROCEDURE — 25010000002 IOPAMIDOL 61 % SOLUTION: Performed by: INTERNAL MEDICINE

## 2018-10-02 RX ADMIN — DIATRIZOATE MEGLUMINE AND DIATRIZOATE SODIUM 30 ML: 660; 100 LIQUID ORAL; RECTAL at 08:46

## 2018-10-02 RX ADMIN — IOPAMIDOL 85 ML: 612 INJECTION, SOLUTION INTRAVENOUS at 08:46

## 2018-10-09 ENCOUNTER — TELEPHONE (OUTPATIENT)
Dept: ONCOLOGY | Facility: HOSPITAL | Age: 53
End: 2018-10-09

## 2018-10-09 ENCOUNTER — DOCUMENTATION (OUTPATIENT)
Dept: ONCOLOGY | Facility: HOSPITAL | Age: 53
End: 2018-10-09

## 2018-10-09 ENCOUNTER — APPOINTMENT (OUTPATIENT)
Dept: LAB | Facility: HOSPITAL | Age: 53
End: 2018-10-09

## 2018-10-09 ENCOUNTER — OFFICE VISIT (OUTPATIENT)
Dept: ONCOLOGY | Facility: CLINIC | Age: 53
End: 2018-10-09

## 2018-10-09 VITALS
DIASTOLIC BLOOD PRESSURE: 64 MMHG | RESPIRATION RATE: 16 BRPM | WEIGHT: 172.4 LBS | BODY MASS INDEX: 24.68 KG/M2 | HEIGHT: 70 IN | SYSTOLIC BLOOD PRESSURE: 96 MMHG | OXYGEN SATURATION: 97 % | HEART RATE: 59 BPM | TEMPERATURE: 98 F

## 2018-10-09 DIAGNOSIS — C20 RECTAL CANCER (HCC): Primary | ICD-10-CM

## 2018-10-09 DIAGNOSIS — E53.8 B12 DEFICIENCY: ICD-10-CM

## 2018-10-09 PROCEDURE — 99214 OFFICE O/P EST MOD 30 MIN: CPT | Performed by: INTERNAL MEDICINE

## 2018-10-09 RX ORDER — CETIRIZINE HYDROCHLORIDE 10 MG/1
10 TABLET ORAL AS NEEDED
COMMUNITY
End: 2021-06-08

## 2018-10-09 NOTE — TELEPHONE ENCOUNTER
Called patient back to make sure he had gotten his questions answered, no answer , left message on voicemail   Advised kevon to call back if he still had questions

## 2018-10-09 NOTE — TELEPHONE ENCOUNTER
----- Message from Dane Viera sent at 10/8/2018 11:14 PM EDT -----  Regarding: Test Results Question  Dr. Pardo,    After discussing the test and CT results during Tuesday's appointment, there were a few questions I wanted to discuss. One is related to the lipids panel test results I am including. These goes back several years and I can explain my questions during the appointment.

## 2018-10-09 NOTE — PROGRESS NOTES
Subjective    REASONS FOR FOLLOW-UP :  1.  Rectal cancer T3N1M0 treated with neoadjuvant chemoradiation  2. ypT3N0 at surgery 5/17 with a positive radial margin reexcised and treated with radiation boost to the right pelvic sidewall postoperatively  3.  Adjuvant FOLFOX 6 x 8 completed in 10/16  4.  Additional surgery in 11/16 with colostomy reversal  5.  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  Unexplained small bilateral pleural effusions first noted in 5/17 not seen in 11/16 CAT scan of the chest resolved in 10/17-recurred in 4/18    History of Present Illness    Mr. Viear is a 51-year-old man returning today for follow-up for rectal cancer.  Genetic testing was completed and consistent with Mathias syndrome with a PMS 2 mutation which is associated with lower risk of subsequent colon cancer then the MLH1 and 2 mutations  .  He is here to review his CAT scans and interestingly an unusually small bilateral pleural effusions have reaccumulated and I have no good cause for this.  The last time his effusions were present they resolve spontaneously.  The only new thing that has happened is a upper and lower endoscopy at the end of December but his had no other symptoms attributable to his small effusions .    He had seen Dr. Gadiel Mendez before he had no good explanation and I asked him to go back and see Dr. Mendez   His colonoscopy and EGD were unremarkable except for some upper GI changes probably from aspirin and no polyps.     he does have issues with obstructive sleep apnea and has not dealt and I wonder if this is in any way causing effusions although it seems unlikely.the CEA is 1.97 in stable and I don't think effusions are related to his colon cancer         Active Ambulatory Problems     Diagnosis Date Noted   • Rectal cancer (CMS/HCC) 03/30/2016   • B12 deficiency 10/18/2016     Resolved Ambulatory Problems     Diagnosis Date Noted   • No Resolved Ambulatory  Problems     Past Medical History:   Diagnosis Date   • Anxiety    • Chest pain    • Colon cancer (CMS/HCC) 2016   • Difficulty in urination    • History of sleep apnea    • Ileostomy in place (CMS/HCC)    • Rectal cancer (CMS/HCC) 03/30/2016   • Seasonal allergies    • Urinary frequency    • Urinary incontinence    • Vitamin B 12 deficiency 10/18/2016     Past Surgical History:   Procedure Laterality Date   • COLONOSCOPY W/ BIOPSIES N/A 2016    Dr. Tree Avila- MALIGNANT    • CYSTOSCOPY URETHROTOMY VISUAL INTERNAL N/A 7/28/2016    Procedure: dilation of uretheral stricture and catheter placement;  Surgeon: Richard Boston MD;  Location: [facility] OR;  Service:    • FOOT SURGERY Left 1983    Benign cyst left foot   • [place] INSJ TUNNELED CVC W/O SUBQ PORT/ AGE 7 YR/> Left 6/22/2016    Procedure: Power Port Placement;  Surgeon: Keely Bermudez MD;  Location: [facility] MAIN OR;  Service: General   • SINUS SURGERY  1996   • TRANSANAL RECTAL RESECTION N/A 05/09/2016    Rectal resection and hand-sewn colon-pouch-anal anastomosis with splenic flexure mobilization, and clipping of right pelvic sidewall for postoperative radiation boost, omental pedicle, Dr. Emily Aguilera         Oncology/Hematology History    1.  Rectal cancer diagnosed January 2016 after presenting with a several month history of rectal bleeding, diarrhea and 10 lb weight loss; clinical stage T3N1M0  2.  Completed neoadjuvant Xeloda with radiation (5040cGy) 3/2/16  3. Surgery May 9 2016  pathology revealed a residual 5cm invasive mucinous adenocarcinoma, low grade, invading through the muscularis propria and into the perirectal adipose tissue with an initial positive radial margin. There was no evidence of lymphvascular space invasion and thirteen lymph nodes were negative for metastatic involvement. Additional distal and radial margin re-excision specimens were negative for carcinoma and excision of right pelvic lateral sidewall was negative  for carcinoma. According to Dr. Aguilera's operative report, the rectum/rectal tumor was densely adhered to the right pelvic side wall and she placed clips in this area for possible adjuvant radiation boost. The initial frozen margins came back negative but due to her concern, she submitted additional right lateral and distal margins and according to the pathology report these additional specimens were negative for carcinoma. However, a comment on the path report states that an permanent sections an additional section of tumor bed was taken, revealing invasive carcinoma that focally extended to the radial margin and this positive margin was within 5mm fo the area taken on frozen section and it is unclear whether the additional radial margin would have covered this; he completed a post-surgery radiation boost to the right pelvic sidewall  4.  Initiated adjuvant FOLFOX-6 6/29/16 and 8 cycles completed 10/18/16 with dose reductions secondary to neuropathy and diarrhea        Rectal cancer (CMS/HCC)    1/19/2016 Initial Diagnosis     Rectal cancer        5/17   He was treated neoadjuvantly with Xeloda and radiation to a dose of 5040 cGy completed in early March 2016.  He then underwent surgical resection by Dr. Aguilera with final pathology showing a low-grade mucinous adenocarcinoma T3 N0 M0 with a questionable positive radial pelvic sidewall margin.  Surgical clips were placed in the area of concern in the pelvic sidewall, and he completed a radiation boost to this area.  The tumor had a regression score of 2 and histologic features suggestive of MSI; immunohistochemistry of tumor cells was positive for MLH-1, MSH-2, and MSH-6.  He completed 8 cycles of adjuvant FOLFOX 6/10/1816 with dose reductions for neuropathy and diarrhea.    Patient returns today to review his CAT scans and overall is doing fairly well.  He has no cough or shortness of breath weight and appetite are stable.  His CEA was 1.92 which is normal.   "CAT scans show minimal small pleural effusions which were not seen in November prior to his second surgery.  There are very small and not tappable and I suspect there residual from his surgery and I will try and get some x-rays from [facility] if they were done postoperatively.  I recommended we repeat his chest CT in 3 months to make sure there is resolution and no progression of these findings and overall I'm not very worried that these are related to metastatic disease.    He has had blood drawn for genetic testing consistent with Mathias syndrome with a PMS 2 mutation and the VUS in the DICER gene      SOCIAL HISTORY: He is , has 1 daughter. He is currently employed as a . He has never smoked. Drinks 2-3 glasses of wine per month.  Denies illicit drug use or HIV risk factors.     FAMILY HISTORY: His maternal grandmother had colon cancer in her 70s. No other malignancies in the family. His father had heart disease, hypertension and diabetes.         Review of Systems   Constitutional: Negative for activity change, fatigue and unexpected weight change.   Respiratory: Negative for shortness of breath and wheezing.    Cardiovascular: Negative for chest pain and leg swelling.   Gastrointestinal: Negative for abdominal pain, diarrhea, nausea and rectal pain.   Musculoskeletal: Negative for arthralgias and back pain.   Skin: Negative for color change.   Neurological: Negative for dizziness and weakness.      A comprehensive 14 point review of systems was performed and was negative except as mentioned.    Medications:  The current medication list was reviewed in the EMR    ALLERGIES:  No Known Allergies    Objective      Vitals:    10/09/18 1146   BP: 96/64   Pulse: 59   Resp: 16   Temp: 98 °F (36.7 °C)   TempSrc: Oral   SpO2: 97%   Weight: 78.2 kg (172 lb 6.4 oz)   Height: 178.5 cm (70.28\")  Comment: new  6mth visit   PainSc: 0-No pain     Current Status 10/9/2018 "   ECOG score 0       Physical Exam    GENERAL:  Well-developed, well-nourished in no acute distress.   SKIN:  Warm, dry without rashes, purpura or petechiae.  EYES:  Pupils equal, round and reactive to light.  EOMs intact.  Conjunctivae normal.  EARS:  Hearing intact.  NOSE:  Septum midline.  No excoriations or nasal discharge.  MOUTH:  Tongue is well-papillated; no stomatitis or ulcers.  Lips normal.  THROAT:  Oropharynx without lesions or exudates.  NECK:  Supple with good range of motion; no thyromegaly or masses, no JVD.  LYMPHATICS:  No cervical, supraclavicular, axillary or inguinal adenopathy.  CHEST:  Lungs clear to auscultation. Good airflow.  CARDIAC:  Regular rate and rhythm without murmurs, rubs or gallops. Normal S1,S2.  ABDOMEN:  Not examined  EXTREMITIES:  No clubbing, cyanosis or edema.Eczema on both hands without any joint changes  NEUROLOGICAL:  Cranial Nerves II-XII grossly intact.  No focal neurological deficits.  PSYCHIATRIC:  Normal affect and mood.  '    RECENT LABS:  Hematology WBC   Date Value Ref Range Status   10/02/2018 5.28 4.00 - 10.00 10*3/mm3 Final     RBC   Date Value Ref Range Status   10/02/2018 4.69 4.30 - 5.50 10*6/mm3 Final     Hemoglobin   Date Value Ref Range Status   10/02/2018 14.8 13.5 - 16.5 g/dL Final     Hematocrit   Date Value Ref Range Status   10/02/2018 43.8 40.0 - 49.0 % Final     Platelets   Date Value Ref Range Status   10/02/2018 232 150 - 375 10*3/mm3 Final     Lab Results   Component Value Date    GLUCOSE 60 (L) 10/02/2018    BUN 12 10/02/2018    CREATININE 1.00 10/02/2018    EGFRIFNONA 89 10/02/2018    EGFRIFAFRI  09/20/2016      Comment:      <15 Indicative of kidney failure.    BCR 13.3 10/02/2018    K 3.4 (L) 10/02/2018    CO2 28.2 10/02/2018    CALCIUM 9.1 10/02/2018    ALBUMIN 4.50 10/02/2018    AST 28 10/02/2018    ALT 33 10/02/2018     cea 1.54       Ct CAP  FINDINGS:  CT CHEST: The visualized thyroid gland is normal. No pathological  mediastinal or  hilar lymphadenopathy. No pericardial effusion is seen.  The central airways are patent without endobronchial lesion. There are  tiny bilateral layering effusions present. These are without interval  change from prior imaging. No suspicious pulmonary nodule or focal  airspace disease is present. No pathological axillary lymphadenopathy.     CT ABDOMEN AND PELVIS: The liver demonstrates normal attenuation. No  focal hepatic lesion is seen. The spleen, gallbladder and the pancreas  is normal. Bilateral adrenal glands are normal. Both kidneys are normal  in size and attenuation. Small exophytic cortical cyst is seen at the  superior pole of the left kidney. The urinary bladder is minimally  distended with mild circumferential wall thickening. A primary  anastomosis is seen in the rectal region that appears unremarkable. No  evidence of bowel obstruction. A moderate to large amount of stool is  seen throughout the colon. There is no pathological retroperitoneal  lymphadenopathy.     IMPRESSION:  1. No convincing evidence of local recurrence or metastatic disease  within the chest, abdomen or pelvis.  2. Additional findings as above.             Assessment/Plan   1.t neoadjuvant chemoradiation for a clinical T3 N1 M0 rectal cancer.  At the time of surgery final pathology showed YT3 N0 M0 disease with 13 negative lymph nodes.  There was a questionable positive radial margin involving the right pelvic sidewall and he completed a radiation boost to this area. He completed 8 cycles of FOLFOX 6 with dose reductions in October 2016.    2.  Reversal of colostomy in 11/16  3.  Follow-up CAT scans and 5/17 showed bilateral very small pleural effusions of unknown etiology-Resolved in 11/17  4. mild peripheral neuropathy   5. Mathias syndrome PMS 2 gene mutation On aspirin 650 mg    Plan  Return in 6 months with CEA   We will continue scanning every 6 months through 5 years and check CEAs every 3 months through year 3 and then every  6 months through year 5            10/9/2018      CC:

## 2018-11-19 RX ORDER — ESCITALOPRAM OXALATE 20 MG/1
TABLET ORAL
Qty: 30 TABLET | Refills: 5 | Status: SHIPPED | OUTPATIENT
Start: 2018-11-19 | End: 2019-05-18 | Stop reason: SDUPTHER

## 2019-03-19 ENCOUNTER — HOSPITAL ENCOUNTER (OUTPATIENT)
Dept: PET IMAGING | Facility: HOSPITAL | Age: 54
End: 2019-03-19
Attending: INTERNAL MEDICINE

## 2019-03-19 ENCOUNTER — TELEPHONE (OUTPATIENT)
Dept: ONCOLOGY | Facility: CLINIC | Age: 54
End: 2019-03-19

## 2019-03-22 ENCOUNTER — HOSPITAL ENCOUNTER (OUTPATIENT)
Dept: PET IMAGING | Facility: HOSPITAL | Age: 54
Discharge: HOME | End: 2019-03-22
Attending: INTERNAL MEDICINE | Admitting: INTERNAL MEDICINE

## 2019-03-22 ENCOUNTER — LAB (OUTPATIENT)
Dept: LAB | Facility: HOSPITAL | Age: 54
End: 2019-03-22

## 2019-03-22 DIAGNOSIS — C20 RECTAL CANCER (HCC): ICD-10-CM

## 2019-03-22 DIAGNOSIS — E53.8 B12 DEFICIENCY: ICD-10-CM

## 2019-03-22 LAB
ALBUMIN SERPL-MCNC: 4.6 G/DL (ref 3.5–5.2)
ALBUMIN/GLOB SERPL: 1.8 G/DL (ref 1.1–2.4)
ALP SERPL-CCNC: 68 U/L (ref 38–116)
ALT SERPL W P-5'-P-CCNC: 31 U/L (ref 0–41)
ANION GAP SERPL CALCULATED.3IONS-SCNC: 13.7 MMOL/L
AST SERPL-CCNC: 23 U/L (ref 0–40)
BASOPHILS # BLD AUTO: 0.04 10*3/MM3 (ref 0–0.2)
BASOPHILS NFR BLD AUTO: 0.9 % (ref 0–1.5)
BILIRUB SERPL-MCNC: 0.9 MG/DL (ref 0.2–1.2)
BUN BLD-MCNC: 9 MG/DL (ref 6–20)
BUN/CREAT SERPL: 10.8 (ref 7.3–30)
CALCIUM SPEC-SCNC: 9.4 MG/DL (ref 8.5–10.2)
CEA SERPL-MCNC: 1.67 NG/ML
CHLORIDE SERPL-SCNC: 100 MMOL/L (ref 98–107)
CO2 SERPL-SCNC: 28.3 MMOL/L (ref 22–29)
CREAT BLD-MCNC: 0.83 MG/DL (ref 0.7–1.3)
CREAT BLDA-MCNC: 0.8 MG/DL (ref 0.6–1.3)
DEPRECATED RDW RBC AUTO: 41.3 FL (ref 37–54)
EOSINOPHIL # BLD AUTO: 0.08 10*3/MM3 (ref 0–0.4)
EOSINOPHIL NFR BLD AUTO: 1.8 % (ref 0.3–6.2)
ERYTHROCYTE [DISTWIDTH] IN BLOOD BY AUTOMATED COUNT: 12.2 % (ref 12.3–15.4)
GFR SERPL CREATININE-BSD FRML MDRD: 97 ML/MIN/1.73
GLOBULIN UR ELPH-MCNC: 2.5 GM/DL (ref 1.8–3.5)
GLUCOSE BLD-MCNC: 80 MG/DL (ref 74–124)
HCT VFR BLD AUTO: 42.9 % (ref 37.5–51)
HGB BLD-MCNC: 14.6 G/DL (ref 13–17.7)
IMM GRANULOCYTES # BLD AUTO: 0.01 10*3/MM3 (ref 0–0.05)
IMM GRANULOCYTES NFR BLD AUTO: 0.2 % (ref 0–0.5)
LYMPHOCYTES # BLD AUTO: 1.07 10*3/MM3 (ref 0.7–3.1)
LYMPHOCYTES NFR BLD AUTO: 24.3 % (ref 19.6–45.3)
MCH RBC QN AUTO: 31.4 PG (ref 26.6–33)
MCHC RBC AUTO-ENTMCNC: 34 G/DL (ref 31.5–35.7)
MCV RBC AUTO: 92.3 FL (ref 79–97)
MONOCYTES # BLD AUTO: 0.36 10*3/MM3 (ref 0.1–0.9)
MONOCYTES NFR BLD AUTO: 8.2 % (ref 5–12)
NEUTROPHILS # BLD AUTO: 2.85 10*3/MM3 (ref 1.4–7)
NEUTROPHILS NFR BLD AUTO: 64.6 % (ref 42.7–76)
NRBC BLD AUTO-RTO: 0 /100 WBC (ref 0–0)
PLATELET # BLD AUTO: 216 10*3/MM3 (ref 140–450)
PMV BLD AUTO: 9.4 FL (ref 6–12)
POTASSIUM BLD-SCNC: 3.9 MMOL/L (ref 3.5–4.7)
PROT SERPL-MCNC: 7.1 G/DL (ref 6.3–8)
RBC # BLD AUTO: 4.65 10*6/MM3 (ref 4.14–5.8)
SODIUM BLD-SCNC: 142 MMOL/L (ref 134–145)
WBC NRBC COR # BLD: 4.41 10*3/MM3 (ref 3.4–10.8)

## 2019-03-22 PROCEDURE — 36415 COLL VENOUS BLD VENIPUNCTURE: CPT | Performed by: INTERNAL MEDICINE

## 2019-03-22 PROCEDURE — 82565 ASSAY OF CREATININE: CPT

## 2019-03-22 PROCEDURE — 85025 COMPLETE CBC W/AUTO DIFF WBC: CPT | Performed by: INTERNAL MEDICINE

## 2019-03-22 PROCEDURE — 74177 CT ABD & PELVIS W/CONTRAST: CPT

## 2019-03-22 PROCEDURE — 25010000002 IOPAMIDOL 61 % SOLUTION: Performed by: INTERNAL MEDICINE

## 2019-03-22 PROCEDURE — 71260 CT THORAX DX C+: CPT

## 2019-03-22 PROCEDURE — 0 DIATRIZOATE MEGLUMINE & SODIUM PER 1 ML: Performed by: INTERNAL MEDICINE

## 2019-03-22 PROCEDURE — 80053 COMPREHEN METABOLIC PANEL: CPT | Performed by: INTERNAL MEDICINE

## 2019-03-22 PROCEDURE — 82378 CARCINOEMBRYONIC ANTIGEN: CPT | Performed by: INTERNAL MEDICINE

## 2019-03-22 RX ADMIN — IOPAMIDOL 85 ML: 612 INJECTION, SOLUTION INTRAVENOUS at 14:15

## 2019-03-22 RX ADMIN — DIATRIZOATE MEGLUMINE AND DIATRIZOATE SODIUM 30 ML: 660; 100 LIQUID ORAL; RECTAL at 13:22

## 2019-03-26 ENCOUNTER — APPOINTMENT (OUTPATIENT)
Dept: LAB | Facility: HOSPITAL | Age: 54
End: 2019-03-26

## 2019-03-26 ENCOUNTER — TELEPHONE (OUTPATIENT)
Dept: ONCOLOGY | Facility: CLINIC | Age: 54
End: 2019-03-26

## 2019-03-26 ENCOUNTER — APPOINTMENT (OUTPATIENT)
Dept: ONCOLOGY | Facility: CLINIC | Age: 54
End: 2019-03-26

## 2019-04-05 ENCOUNTER — APPOINTMENT (OUTPATIENT)
Dept: LAB | Facility: HOSPITAL | Age: 54
End: 2019-04-05

## 2019-04-05 ENCOUNTER — OFFICE VISIT (OUTPATIENT)
Dept: ONCOLOGY | Facility: CLINIC | Age: 54
End: 2019-04-05

## 2019-04-05 VITALS
HEIGHT: 70 IN | HEART RATE: 61 BPM | SYSTOLIC BLOOD PRESSURE: 111 MMHG | RESPIRATION RATE: 16 BRPM | WEIGHT: 167.8 LBS | BODY MASS INDEX: 24.02 KG/M2 | OXYGEN SATURATION: 97 % | DIASTOLIC BLOOD PRESSURE: 71 MMHG | TEMPERATURE: 97.8 F

## 2019-04-05 DIAGNOSIS — C20 RECTAL CANCER (HCC): Primary | ICD-10-CM

## 2019-04-05 PROCEDURE — 99214 OFFICE O/P EST MOD 30 MIN: CPT | Performed by: INTERNAL MEDICINE

## 2019-04-05 PROCEDURE — G0463 HOSPITAL OUTPT CLINIC VISIT: HCPCS | Performed by: INTERNAL MEDICINE

## 2019-04-05 NOTE — PROGRESS NOTES
Subjective    REASONS FOR FOLLOW-UP :  1.  Rectal cancer T3N1M0 treated with neoadjuvant chemoradiation  2. ypT3N0 at surgery 5/17 with a positive radial margin reexcised and treated with radiation boost to the right pelvic sidewall postoperatively  3.  Adjuvant FOLFOX 6 x 8 completed in 10/16  4.  Additional surgery in 11/16 with colostomy reversal  5.  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  Unexplained small bilateral pleural effusions first noted in 5/17 not seen in 11/16 CAT scan of the chest resolved in 10/17-recurred in 4/18    History of Present Illness    Mr. Viera is a 54year-old man returning today for follow-up for rectal cancer.  Genetic testing wis consistent with Mathias syndrome with a PMS 2 mutation which is associated with lower risk of subsequent colon cancer then the MLH1 and 2 mutations  .  He is here to review his CAT scans and small bilateral pleural effusions have persisted now for 2 years with no and I have no good cause for this.  Stability is reassuring    His colonoscopy and EGD were unremarkable except for some upper GI changes probably from aspirin and no polyps.  In February     he does have issues with obstructive sleep apnea and has not dealt with it   CEA is 1.67  Active Ambulatory Problems     Diagnosis Date Noted   • Rectal cancer (CMS/HCC) 03/30/2016   • B12 deficiency 10/18/2016     Resolved Ambulatory Problems     Diagnosis Date Noted   • No Resolved Ambulatory Problems     Past Medical History:   Diagnosis Date   • Anxiety    • Chest pain    • Colon cancer (CMS/HCC) 2016   • Difficulty in urination    • History of sleep apnea    • Ileostomy in place (CMS/HCC)    • Rectal cancer (CMS/HCC) 03/30/2016   • Seasonal allergies    • Urinary frequency    • Urinary incontinence    • Vitamin B 12 deficiency 10/18/2016     Past Surgical History:   Procedure Laterality Date   • COLONOSCOPY W/ BIOPSIES N/A 2016    Dr. Tree Avila- MALIGNANT    •  CYSTOSCOPY URETHROTOMY VISUAL INTERNAL N/A 7/28/2016    Procedure: dilation of uretheral stricture and catheter placement;  Surgeon: Richard Boston MD;  Location: [facility] OR;  Service:    • FOOT SURGERY Left 1983    Benign cyst left foot   • [place] INSJ TUNNELED CVC W/O SUBQ PORT/ AGE 7 YR/> Left 6/22/2016    Procedure: Power Port Placement;  Surgeon: Keely Bermudez MD;  Location: [facility] OR;  Service: General   • SINUS SURGERY  1996   • TRANSANAL RECTAL RESECTION N/A 05/09/2016    Rectal resection and hand-sewn colon-pouch-anal anastomosis with splenic flexure mobilization, and clipping of right pelvic sidewall for postoperative radiation boost, omental pedicle, Dr. Emily Aguilera         Oncology/Hematology History    1.  Rectal cancer diagnosed January 2016 after presenting with a several month history of rectal bleeding, diarrhea and 10 lb weight loss; clinical stage T3N1M0  2.  Completed neoadjuvant Xeloda with radiation (5040cGy) 3/2/16  3. Surgery May 9 2016  pathology revealed a residual 5cm invasive mucinous adenocarcinoma, low grade, invading through the muscularis propria and into the perirectal adipose tissue with an initial positive radial margin. There was no evidence of lymphvascular space invasion and thirteen lymph nodes were negative for metastatic involvement. Additional distal and radial margin re-excision specimens were negative for carcinoma and excision of right pelvic lateral sidewall was negative for carcinoma. According to Dr. Aguilera's operative report, the rectum/rectal tumor was densely adhered to the right pelvic side wall and she placed clips in this area for possible adjuvant radiation boost. The initial frozen margins came back negative but due to her concern, she submitted additional right lateral and distal margins and according to the pathology report these additional specimens were negative for carcinoma. However, a comment on the path report states that an  permanent sections an additional section of tumor bed was taken, revealing invasive carcinoma that focally extended to the radial margin and this positive margin was within 5mm fo the area taken on frozen section and it is unclear whether the additional radial margin would have covered this; he completed a post-surgery radiation boost to the right pelvic sidewall  4.  Initiated adjuvant FOLFOX-6 6/29/16 and 8 cycles completed 10/18/16 with dose reductions secondary to neuropathy and diarrhea        Rectal cancer (CMS/HCC)    1/19/2016 Initial Diagnosis     Rectal cancer        5/17   He was treated neoadjuvantly with Xeloda and radiation to a dose of 5040 cGy completed in early March 2016.  He then underwent surgical resection by Dr. Aguilera with final pathology showing a low-grade mucinous adenocarcinoma T3 N0 M0 with a questionable positive radial pelvic sidewall margin.  Surgical clips were placed in the area of concern in the pelvic sidewall, and he completed a radiation boost to this area.  The tumor had a regression score of 2 and histologic features suggestive of MSI; immunohistochemistry of tumor cells was positive for MLH-1, MSH-2, and MSH-6.  He completed 8 cycles of adjuvant FOLFOX 6/10/1816 with dose reductions for neuropathy and diarrhea.    Patient returns today to review his CAT scans and overall is doing fairly well.  He has no cough or shortness of breath weight and appetite are stable.  His CEA was 1.92 which is normal.  CAT scans show minimal small pleural effusions which were not seen in November prior to his second surgery.  There are very small and not tappable and I suspect there residual from his surgery and I will try and get some x-rays from [facility] if they were done postoperatively.  I recommended we repeat his chest CT in 3 months to make sure there is resolution and no progression of these findings and overall I'm not very worried that these are related to  "metastatic disease.    He has had blood drawn for genetic testing consistent with Mathias syndrome with a PMS 2 mutation and the VUS in the DICER gene      SOCIAL HISTORY: He is , has 1 daughter. He is currently employed as a . He has never smoked. Drinks 2-3 glasses of wine per month.  Denies illicit drug use or HIV risk factors.     FAMILY HISTORY: His maternal grandmother had colon cancer in her 70s. No other malignancies in the family. His father had heart disease, hypertension and diabetes.         Review of Systems   Constitutional: Negative for activity change, fatigue and unexpected weight change.   Respiratory: Negative for chest tightness, shortness of breath and wheezing.    Cardiovascular: Negative for chest pain and leg swelling.   Gastrointestinal: Negative for abdominal pain, diarrhea, nausea and rectal pain.   Musculoskeletal: Positive for back pain (low back pain off and on 4/5/19). Negative for arthralgias, joint swelling and myalgias.   Skin: Negative for color change.   Neurological: Negative for dizziness, weakness and headaches.   Psychiatric/Behavioral: The patient is not nervous/anxious.       A comprehensive 14 point review of systems was performed and was negative except as mentioned.    Medications:  The current medication list was reviewed in the EMR    ALLERGIES:  No Known Allergies    Objective      Vitals:    04/05/19 0835   BP: 111/71   Pulse: 61   Resp: 16   Temp: 97.8 °F (36.6 °C)   SpO2: 97%   Weight: 76.1 kg (167 lb 12.8 oz)   Height: 178.5 cm (70.28\")   PainSc: 0-No pain     Current Status 4/5/2019   ECOG score 0       Physical Exam    GENERAL:  Well-developed, well-nourished in no acute distress.   SKIN:  Warm, dry without rashes, purpura or petechiae.  EYES:  Pupils equal, round and reactive to light.  EOMs intact.  Conjunctivae normal.  EARS:  Hearing intact.  NOSE:  Septum midline.  No excoriations or nasal discharge.  MOUTH:  Tongue is " well-papillated; no stomatitis or ulcers.  Lips normal.  THROAT:  Oropharynx without lesions or exudates.  NECK:  Supple with good range of motion; no thyromegaly or masses, no JVD.  LYMPHATICS:  No cervical, supraclavicular, axillary or inguinal adenopathy.  CHEST:  Lungs clear to auscultation. Good airflow.  CARDIAC:  Regular rate and rhythm without murmurs, rubs or gallops. Normal S1,S2.  ABDOMEN:  Not examined  EXTREMITIES:  No clubbing, cyanosis or edema.Eczema on both hands without any joint changes  NEUROLOGICAL:  Cranial Nerves II-XII grossly intact.  No focal neurological deficits.  PSYCHIATRIC:  Normal affect and mood.  '    RECENT LABS:  Hematology WBC   Date Value Ref Range Status   03/22/2019 4.41 3.40 - 10.80 10*3/mm3 Final     RBC   Date Value Ref Range Status   03/22/2019 4.65 4.14 - 5.80 10*6/mm3 Final     Hemoglobin   Date Value Ref Range Status   03/22/2019 14.6 13.0 - 17.7 g/dL Final     Hematocrit   Date Value Ref Range Status   03/22/2019 42.9 37.5 - 51.0 % Final     Platelets   Date Value Ref Range Status   03/22/2019 216 140 - 450 10*3/mm3 Final     Lab Results   Component Value Date    GLUCOSE 80 03/22/2019    BUN 9 03/22/2019    CREATININE 0.80 03/22/2019    EGFRIFNONA 97 03/22/2019    EGFRIFAFRI  09/20/2016      Comment:      <15 Indicative of kidney failure.    BCR 10.8 03/22/2019    K 3.9 03/22/2019    CO2 28.3 03/22/2019    CALCIUM 9.4 03/22/2019    ALBUMIN 4.60 03/22/2019    AST 23 03/22/2019    ALT 31 03/22/2019     cea 1.54       Ct CAP  FINDINGS:  CT CHEST: The visualized thyroid gland is normal. No pathological  mediastinal or hilar lymphadenopathy. No pericardial effusion is seen.  The central airways are patent without endobronchial lesion. There are  tiny bilateral layering effusions present. These are without interval  change from prior imaging. No suspicious pulmonary nodule or focal  airspace disease is present. No pathological axillary lymphadenopathy.     CT ABDOMEN AND  PELVIS: The liver demonstrates normal attenuation. No  focal hepatic lesion is seen. The spleen, gallbladder and the pancreas  is normal. Bilateral adrenal glands are normal. Both kidneys are normal  in size and attenuation. Small exophytic cortical cyst is seen at the  superior pole of the left kidney. The urinary bladder is minimally  distended with mild circumferential wall thickening. A primary  anastomosis is seen in the rectal region that appears unremarkable. No  evidence of bowel obstruction. A moderate to large amount of stool is  seen throughout the colon. There is no pathological retroperitoneal  lymphadenopathy.     IMPRESSION:  1. No convincing evidence of local recurrence or metastatic disease  within the chest, abdomen or pelvis.  2. Additional findings as above.         CT CHEST, ABDOMEN, AND PELVIS WITH IV CONTRAST     IMPRESSION:  1. There are stable tiny pleural effusions. There is no new abnormality  or convincing evidence for metastatic disease within the chest.  2. There is no evidence for metastatic disease within the abdomen or  pelvis.     This report was finalized on 3/25/2019     Assessment/Plan   1.t neoadjuvant chemoradiation for a clinical T3 N1 M0 rectal cancer.  At the time of surgery final pathology showed YT3 N0 M0 disease with 13 negative lymph nodes.  There was a questionable positive radial margin involving the right pelvic sidewall and he completed a radiation boost to this area. He completed 8 cycles of FOLFOX 6 with dose reductions in October 2016.    2.  Reversal of colostomy in 11/16  3.  Follow-up CAT scans and 5/17 showed bilateral very small pleural effusions of unknown etiology-Resolved in 11/17  4. mild peripheral neuropathy   5. Mathias syndrome PMS 2 gene mutation On aspirin 650 mg    Plan  Return in 6 months with CEA and CAT scans  We will continue scanning every 6 months through 5 years and check CEAs every 3 months through year 3 and then every 6 months through year  5            4/5/2019      CC:

## 2019-05-20 RX ORDER — ESCITALOPRAM OXALATE 20 MG/1
TABLET ORAL
Qty: 30 TABLET | Refills: 5 | Status: SHIPPED | OUTPATIENT
Start: 2019-05-20 | End: 2019-11-18 | Stop reason: SDUPTHER

## 2019-09-12 ENCOUNTER — TELEPHONE (OUTPATIENT)
Dept: GENERAL RADIOLOGY | Facility: HOSPITAL | Age: 54
End: 2019-09-12

## 2019-09-12 NOTE — TELEPHONE ENCOUNTER
----- Message from Beth Garibay sent at 9/12/2019 11:18 AM EDT -----  Per Ally Deleon, can this patient's scan be moved Tuesday or Wednesday next week? Pt was having insurance problems, and now it is going to take 3 days to get a pre-cert. Thanks!!

## 2019-09-13 ENCOUNTER — APPOINTMENT (OUTPATIENT)
Dept: PET IMAGING | Facility: HOSPITAL | Age: 54
End: 2019-09-13

## 2019-09-18 ENCOUNTER — LAB (OUTPATIENT)
Dept: LAB | Facility: HOSPITAL | Age: 54
End: 2019-09-18

## 2019-09-18 ENCOUNTER — HOSPITAL ENCOUNTER (OUTPATIENT)
Dept: PET IMAGING | Facility: HOSPITAL | Age: 54
Discharge: HOME | End: 2019-09-18
Admitting: INTERNAL MEDICINE

## 2019-09-18 DIAGNOSIS — C20 RECTAL CANCER (HCC): ICD-10-CM

## 2019-09-18 LAB
ALBUMIN SERPL-MCNC: 4.4 G/DL (ref 3.5–5.2)
ALBUMIN/GLOB SERPL: 1.7 G/DL (ref 1.1–2.4)
ALP SERPL-CCNC: 72 U/L (ref 38–116)
ALT SERPL W P-5'-P-CCNC: 23 U/L (ref 0–41)
ANION GAP SERPL CALCULATED.3IONS-SCNC: 13.2 MMOL/L (ref 5–15)
AST SERPL-CCNC: 19 U/L (ref 0–40)
BASOPHILS # BLD AUTO: 0.05 10*3/MM3 (ref 0–0.2)
BASOPHILS NFR BLD AUTO: 1 % (ref 0–1.5)
BILIRUB SERPL-MCNC: 0.6 MG/DL (ref 0.2–1.2)
BUN BLD-MCNC: 12 MG/DL (ref 6–20)
BUN/CREAT SERPL: 13.3 (ref 7.3–30)
CALCIUM SPEC-SCNC: 9.1 MG/DL (ref 8.5–10.2)
CEA SERPL-MCNC: 1.61 NG/ML
CHLORIDE SERPL-SCNC: 100 MMOL/L (ref 98–107)
CO2 SERPL-SCNC: 28.8 MMOL/L (ref 22–29)
CREAT BLD-MCNC: 0.9 MG/DL (ref 0.7–1.3)
CREAT BLDA-MCNC: 0.9 MG/DL (ref 0.6–1.3)
DEPRECATED RDW RBC AUTO: 42.5 FL (ref 37–54)
EOSINOPHIL # BLD AUTO: 0.12 10*3/MM3 (ref 0–0.4)
EOSINOPHIL NFR BLD AUTO: 2.5 % (ref 0.3–6.2)
ERYTHROCYTE [DISTWIDTH] IN BLOOD BY AUTOMATED COUNT: 12.1 % (ref 12.3–15.4)
GFR SERPL CREATININE-BSD FRML MDRD: 89 ML/MIN/1.73
GLOBULIN UR ELPH-MCNC: 2.6 GM/DL (ref 1.8–3.5)
GLUCOSE BLD-MCNC: 56 MG/DL (ref 74–124)
HCT VFR BLD AUTO: 42.9 % (ref 37.5–51)
HGB BLD-MCNC: 14.3 G/DL (ref 13–17.7)
IMM GRANULOCYTES # BLD AUTO: 0.01 10*3/MM3 (ref 0–0.05)
IMM GRANULOCYTES NFR BLD AUTO: 0.2 % (ref 0–0.5)
LYMPHOCYTES # BLD AUTO: 1.71 10*3/MM3 (ref 0.7–3.1)
LYMPHOCYTES NFR BLD AUTO: 35.1 % (ref 19.6–45.3)
MCH RBC QN AUTO: 31.9 PG (ref 26.6–33)
MCHC RBC AUTO-ENTMCNC: 33.3 G/DL (ref 31.5–35.7)
MCV RBC AUTO: 95.8 FL (ref 79–97)
MONOCYTES # BLD AUTO: 0.59 10*3/MM3 (ref 0.1–0.9)
MONOCYTES NFR BLD AUTO: 12.1 % (ref 5–12)
NEUTROPHILS # BLD AUTO: 2.39 10*3/MM3 (ref 1.7–7)
NEUTROPHILS NFR BLD AUTO: 49.1 % (ref 42.7–76)
NRBC BLD AUTO-RTO: 0 /100 WBC (ref 0–0.2)
PLATELET # BLD AUTO: 220 10*3/MM3 (ref 140–450)
PMV BLD AUTO: 9 FL (ref 6–12)
POTASSIUM BLD-SCNC: 3.6 MMOL/L (ref 3.5–4.7)
PROT SERPL-MCNC: 7 G/DL (ref 6.3–8)
RBC # BLD AUTO: 4.48 10*6/MM3 (ref 4.14–5.8)
SODIUM BLD-SCNC: 142 MMOL/L (ref 134–145)
WBC NRBC COR # BLD: 4.87 10*3/MM3 (ref 3.4–10.8)

## 2019-09-18 PROCEDURE — 71260 CT THORAX DX C+: CPT

## 2019-09-18 PROCEDURE — 82565 ASSAY OF CREATININE: CPT

## 2019-09-18 PROCEDURE — 85025 COMPLETE CBC W/AUTO DIFF WBC: CPT | Performed by: INTERNAL MEDICINE

## 2019-09-18 PROCEDURE — 82378 CARCINOEMBRYONIC ANTIGEN: CPT | Performed by: INTERNAL MEDICINE

## 2019-09-18 PROCEDURE — 36415 COLL VENOUS BLD VENIPUNCTURE: CPT | Performed by: INTERNAL MEDICINE

## 2019-09-18 PROCEDURE — 80053 COMPREHEN METABOLIC PANEL: CPT | Performed by: INTERNAL MEDICINE

## 2019-09-18 PROCEDURE — 25010000002 IOPAMIDOL 61 % SOLUTION: Performed by: INTERNAL MEDICINE

## 2019-09-18 PROCEDURE — 0 DIATRIZOATE MEGLUMINE & SODIUM PER 1 ML: Performed by: INTERNAL MEDICINE

## 2019-09-18 PROCEDURE — 74177 CT ABD & PELVIS W/CONTRAST: CPT

## 2019-09-18 RX ADMIN — DIATRIZOATE MEGLUMINE AND DIATRIZOATE SODIUM 30 ML: 660; 100 LIQUID ORAL; RECTAL at 07:56

## 2019-09-18 RX ADMIN — IOPAMIDOL 85 ML: 612 INJECTION, SOLUTION INTRAVENOUS at 08:51

## 2019-09-20 ENCOUNTER — APPOINTMENT (OUTPATIENT)
Dept: LAB | Facility: HOSPITAL | Age: 54
End: 2019-09-20

## 2019-09-20 ENCOUNTER — APPOINTMENT (OUTPATIENT)
Dept: ONCOLOGY | Facility: CLINIC | Age: 54
End: 2019-09-20

## 2019-10-10 NOTE — PROGRESS NOTES
Subjective    REASONS FOR FOLLOW-UP :  1.  Rectal cancer T3N1M0 treated with neoadjuvant chemoradiation  2. ypT3N0 at surgery 5/17 with a positive radial margin reexcised and treated with radiation boost to the right pelvic sidewall postoperatively  3.  Adjuvant FOLFOX 6 x 8 completed in 10/16  4.  Additional surgery in 11/16 with colostomy reversal  5.  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  Unexplained small bilateral pleural effusions first noted in 5/17 not seen in 11/16 CAT scan of the chest resolved in 10/17-recurred in 4/18    History of Present Illness    Mr. Viera is a 54year-old man returning today for follow-up for rectal cancer.  Genetic testing wis consistent with Mathias syndrome with a PMS 2 mutation which is associated with lower risk of subsequent colon cancer then the MLH1 and 2 mutations    .  Continues on aspirin 650 daily for polyp prevention    He is here to review his CAT scans and small bilateral pleural effusions have persisted now for 2.5 years with no and I have no good cause for this.  Stability is reassuring-pancreas appears normal    His colonoscopy and EGD were unremarkable except for some upper GI changes probably from aspirin and no polyps in February     he does have issues with obstructive sleep apnea and has not dealt with it   CEA is 1.61 stable  He is watching his diet and exercising and lost 10 pounds but this is intentional and otherwise he feels well      Active Ambulatory Problems     Diagnosis Date Noted   • Rectal cancer (CMS/HCC) 03/30/2016   • B12 deficiency 10/18/2016     Resolved Ambulatory Problems     Diagnosis Date Noted   • No Resolved Ambulatory Problems     Past Medical History:   Diagnosis Date   • Anxiety    • Chest pain    • Colon cancer (CMS/HCC) 2016   • Difficulty in urination    • History of sleep apnea    • Ileostomy in place (CMS/HCC)    • Rectal cancer (CMS/HCC) 03/30/2016   • Seasonal allergies    • Urinary  frequency    • Urinary incontinence    • Vitamin B 12 deficiency 10/18/2016     Past Surgical History:   Procedure Laterality Date   • COLONOSCOPY W/ BIOPSIES N/A 2016    Dr. Tree Avila- MALIGNANT    • CYSTOSCOPY URETHROTOMY VISUAL INTERNAL N/A 7/28/2016    Procedure: dilation of uretheral stricture and catheter placement;  Surgeon: Richard Boston MD;  Location: [facility] OR;  Service:    • FOOT SURGERY Left 1983    Benign cyst left foot   • [place] INSJ TUNNELED CVC W/O SUBQ PORT/ AGE 7 YR/> Left 6/22/2016    Procedure: Power Port Placement;  Surgeon: Keely Bermudez MD;  Location: [facility] OR;  Service: General   • SINUS SURGERY  1996   • TRANSANAL RECTAL RESECTION N/A 05/09/2016    Rectal resection and hand-sewn colon-pouch-anal anastomosis with splenic flexure mobilization, and clipping of right pelvic sidewall for postoperative radiation boost, omental pedicle, Dr. Emily Aguilera         Oncology/Hematology History    1.  Rectal cancer diagnosed January 2016 after presenting with a several month history of rectal bleeding, diarrhea and 10 lb weight loss; clinical stage T3N1M0  2.  Completed neoadjuvant Xeloda with radiation (5040cGy) 3/2/16  3. Surgery May 9 2016  pathology revealed a residual 5cm invasive mucinous adenocarcinoma, low grade, invading through the muscularis propria and into the perirectal adipose tissue with an initial positive radial margin. There was no evidence of lymphvascular space invasion and thirteen lymph nodes were negative for metastatic involvement. Additional distal and radial margin re-excision specimens were negative for carcinoma and excision of right pelvic lateral sidewall was negative for carcinoma. According to Dr. Aguilera's operative report, the rectum/rectal tumor was densely adhered to the right pelvic side wall and she placed clips in this area for possible adjuvant radiation boost. The initial frozen margins came back negative but due to her concern,  she submitted additional right lateral and distal margins and according to the pathology report these additional specimens were negative for carcinoma. However, a comment on the path report states that an permanent sections an additional section of tumor bed was taken, revealing invasive carcinoma that focally extended to the radial margin and this positive margin was within 5mm fo the area taken on frozen section and it is unclear whether the additional radial margin would have covered this; he completed a post-surgery radiation boost to the right pelvic sidewall  4.  Initiated adjuvant FOLFOX-6 6/29/16 and 8 cycles completed 10/18/16 with dose reductions secondary to neuropathy and diarrhea        Rectal cancer (CMS/HCC)    1/19/2016 Initial Diagnosis     Rectal cancer        5/17   He was treated neoadjuvantly with Xeloda and radiation to a dose of 5040 cGy completed in early March 2016.  He then underwent surgical resection by Dr. Aguilera with final pathology showing a low-grade mucinous adenocarcinoma T3 N0 M0 with a questionable positive radial pelvic sidewall margin.  Surgical clips were placed in the area of concern in the pelvic sidewall, and he completed a radiation boost to this area.  The tumor had a regression score of 2 and histologic features suggestive of MSI; immunohistochemistry of tumor cells was positive for MLH-1, MSH-2, and MSH-6.  He completed 8 cycles of adjuvant FOLFOX 6/10/1816 with dose reductions for neuropathy and diarrhea.    Patient returns today to review his CAT scans and overall is doing fairly well.  He has no cough or shortness of breath weight and appetite are stable.  His CEA was 1.92 which is normal.  CAT scans show minimal small pleural effusions which were not seen in November prior to his second surgery.  There are very small and not tappable and I suspect there residual from his surgery and I will try and get some x-[facility] from [facility] if they were  "done postoperatively.  I recommended we repeat his chest CT in 3 months to make sure there is resolution and no progression of these findings and overall I'm not very worried that these are related to metastatic disease.    He has had blood drawn for genetic testing consistent with Mathias syndrome with a PMS 2 mutation and the VUS in the DICER gene      SOCIAL HISTORY: He is , has 1 daughter. He is currently employed as a . He has never smoked. Drinks 2-3 glasses of wine per month.  Denies illicit drug use or HIV risk factors.     FAMILY HISTORY: His maternal grandmother had colon cancer in her 70s. No other malignancies in the family. His father had heart disease, hypertension and diabetes.         Review of Systems   Constitutional: Negative for activity change, fatigue and unexpected weight change.   Respiratory: Negative for chest tightness, shortness of breath and wheezing.    Cardiovascular: Negative for chest pain and leg swelling.   Gastrointestinal: Negative for abdominal pain, diarrhea, nausea and rectal pain.   Musculoskeletal: Positive for back pain (low back pain off and on same 10/11/19). Negative for arthralgias, joint swelling and myalgias.   Skin: Negative for color change.   Neurological: Negative for dizziness, weakness and headaches.   Psychiatric/Behavioral: The patient is not nervous/anxious.       A comprehensive 14 point review of systems was performed and was negative except as mentioned.    Medications:  The current medication list was reviewed in the EMR    ALLERGIES:  No Known Allergies    Objective      Vitals:    10/11/19 0923   BP: 117/76   Pulse: 60   Resp: 16   Temp: 98.4 °F (36.9 °C)   SpO2: 99%   Weight: 75.4 kg (166 lb 3.2 oz)   Height: 178.5 cm (70.28\")   PainSc: 0-No pain     Current Status 10/11/2019   ECOG score 0       Physical Exam    GENERAL:  Well-developed, well-nourished in no acute distress.   SKIN:  Warm, dry without rashes, purpura or " petechiae.  EYES:  Pupils equal, round and reactive to light.  EOMs intact.  Conjunctivae normal.  EARS:  Hearing intact.  NOSE:  Septum midline.  No excoriations or nasal discharge.  MOUTH:  Tongue is well-papillated; no stomatitis or ulcers.  Lips normal.  THROAT:  Oropharynx without lesions or exudates.  NECK:  Supple with good range of motion; no thyromegaly or masses, no JVD.  LYMPHATICS:  No cervical, supraclavicular, axillary or inguinal adenopathy.  CHEST:  Lungs clear to auscultation. Good airflow.  CARDIAC:  Regular rate and rhythm without murmurs, rubs or gallops. Normal S1,S2.  ABDOMEN:  Not examined  EXTREMITIES:  No clubbing, cyanosis or edema.Eczema on both hands without any joint changes  NEUROLOGICAL:  Cranial Nerves II-XII grossly intact.  No focal neurological deficits.  PSYCHIATRIC:  Normal affect and mood.  '    RECENT LABS:  Hematology WBC   Date Value Ref Range Status   09/18/2019 4.87 3.40 - 10.80 10*3/mm3 Final     RBC   Date Value Ref Range Status   09/18/2019 4.48 4.14 - 5.80 10*6/mm3 Final     Hemoglobin   Date Value Ref Range Status   09/18/2019 14.3 13.0 - 17.7 g/dL Final     Hematocrit   Date Value Ref Range Status   09/18/2019 42.9 37.5 - 51.0 % Final     Platelets   Date Value Ref Range Status   09/18/2019 220 140 - 450 10*3/mm3 Final     Lab Results   Component Value Date    GLUCOSE 56 (C) 09/18/2019    BUN 12 09/18/2019    CREATININE 0.90 09/18/2019    EGFRIFNONA 89 09/18/2019    EGFRIFAFRI  09/20/2016      Comment:      <15 Indicative of kidney failure.    BCR 13.3 09/18/2019    K 3.6 09/18/2019    CO2 28.8 09/18/2019    CALCIUM 9.1 09/18/2019    ALBUMIN 4.40 09/18/2019    AST 19 09/18/2019    ALT 23 09/18/2019     cea 1.61       Ct CAP  FINDINGS:  CT CHEST: The visualized thyroid gland is normal. No pathological  mediastinal or hilar lymphadenopathy. No pericardial effusion is seen.  The central airways are patent without endobronchial lesion. There are  tiny bilateral layering  effusions present. These are without interval  change from prior imaging. No suspicious pulmonary nodule or focal  airspace disease is present. No pathological axillary lymphadenopathy.     CT ABDOMEN AND PELVIS: The liver demonstrates normal attenuation. No  focal hepatic lesion is seen. The spleen, gallbladder and the pancreas  is normal. Bilateral adrenal glands are normal. Both kidneys are normal  in size and attenuation. Small exophytic cortical cyst is seen at the  superior pole of the left kidney. The urinary bladder is minimally  distended with mild circumferential wall thickening. A primary  anastomosis is seen in the rectal region that appears unremarkable. No  evidence of bowel obstruction. A moderate to large amount of stool is  seen throughout the colon. There is no pathological retroperitoneal  lymphadenopathy.     IMPRESSION:  1. No convincing evidence of local recurrence or metastatic disease  within the chest, abdomen or pelvis.  2. Additional findings as above.         CT CHEST, ABDOMEN, AND PELVIS WITH IV CONTRAST     IMPRESSION:  1. There are stable tiny pleural effusions. There is no new abnormality  or convincing evidence for metastatic disease within the chest.  2. There is no evidence for metastatic disease within the abdomen or  pelvis.     This report was finalized on 3/25/2019     CT OF THE CHEST ABDOMEN AND PELVIS WITH CONTRAST 09/18/2019   IMPRESSION:  1. Tiny bilateral pleural effusions appear stable.  2. No new evidence of metastatic disease on this CT of the chest,  abdomen and pelvis.     This report was finalized on 9/19/2019 10:59 AM by Dr. Sampson Ridley M.D.          Assessment/Plan   1.t neoadjuvant chemoradiation for a clinical T3 N1 M0 rectal cancer.  At the time of surgery final pathology showed YT3 N0 M0 disease with 13 negative lymph nodes.  There was a questionable positive radial margin involving the right pelvic sidewall and he completed a radiation boost to this  area. He completed 8 cycles of FOLFOX 6 with dose reductions in October 2016.    2.  Reversal of colostomy in 11/16  3.  Follow-up CAT scans and 5/17 showed bilateral very small pleural effusions of unknown etiology-Resolved in 11/17-back in 2018 but stable  4. mild peripheral neuropathy   5. Mathias syndrome PMS 2 gene mutation On aspirin 650 mg    Plan  Return in 6 months with CEA and CAT scans  We will continue scanning every 6 months through 5 years and check CEAs every 3 months through year 3 and then every 6 months through year 5            10/11/2019      CC:

## 2019-10-11 ENCOUNTER — OFFICE VISIT (OUTPATIENT)
Dept: ONCOLOGY | Facility: CLINIC | Age: 54
End: 2019-10-11

## 2019-10-11 ENCOUNTER — APPOINTMENT (OUTPATIENT)
Dept: LAB | Facility: HOSPITAL | Age: 54
End: 2019-10-11

## 2019-10-11 VITALS
BODY MASS INDEX: 23.79 KG/M2 | HEIGHT: 70 IN | WEIGHT: 166.2 LBS | RESPIRATION RATE: 16 BRPM | DIASTOLIC BLOOD PRESSURE: 76 MMHG | TEMPERATURE: 98.4 F | SYSTOLIC BLOOD PRESSURE: 117 MMHG | OXYGEN SATURATION: 99 % | HEART RATE: 60 BPM

## 2019-10-11 DIAGNOSIS — C20 RECTAL CANCER (HCC): Primary | ICD-10-CM

## 2019-10-11 PROCEDURE — G0463 HOSPITAL OUTPT CLINIC VISIT: HCPCS | Performed by: INTERNAL MEDICINE

## 2019-10-11 PROCEDURE — 99214 OFFICE O/P EST MOD 30 MIN: CPT | Performed by: INTERNAL MEDICINE

## 2019-11-18 RX ORDER — ESCITALOPRAM OXALATE 20 MG/1
TABLET ORAL
Qty: 30 TABLET | Refills: 5 | Status: SHIPPED | OUTPATIENT
Start: 2019-11-18 | End: 2020-04-20

## 2020-03-19 ENCOUNTER — TELEPHONE (OUTPATIENT)
Dept: ONCOLOGY | Facility: HOSPITAL | Age: 55
End: 2020-03-19

## 2020-03-19 ENCOUNTER — TELEPHONE (OUTPATIENT)
Dept: ONCOLOGY | Facility: CLINIC | Age: 55
End: 2020-03-19

## 2020-03-19 DIAGNOSIS — Z00.00 EXAMINATION, GENERAL MEDICAL: ICD-10-CM

## 2020-03-19 DIAGNOSIS — C20 RECTAL CANCER (HCC): Primary | ICD-10-CM

## 2020-03-19 NOTE — TELEPHONE ENCOUNTER
----- Message from Kendy Hernández RN sent at 3/19/2020  2:30 PM EDT -----  Ambulatory care referral place d for a Primary care MD. Please schedule for 3-6 months from now.

## 2020-03-20 ENCOUNTER — APPOINTMENT (OUTPATIENT)
Dept: PET IMAGING | Facility: HOSPITAL | Age: 55
End: 2020-03-20

## 2020-04-20 RX ORDER — ESCITALOPRAM OXALATE 20 MG/1
TABLET ORAL
Qty: 90 TABLET | Refills: 1 | Status: SHIPPED | OUTPATIENT
Start: 2020-04-20 | End: 2020-11-09

## 2020-05-28 ENCOUNTER — HOSPITAL ENCOUNTER (OUTPATIENT)
Dept: CT IMAGING | Facility: HOSPITAL | Age: 55
Discharge: HOME | End: 2020-05-28
Admitting: INTERNAL MEDICINE

## 2020-05-28 ENCOUNTER — HOSPITAL ENCOUNTER (OUTPATIENT)
Dept: PET IMAGING | Facility: HOSPITAL | Age: 55
End: 2020-05-28

## 2020-05-28 DIAGNOSIS — C20 RECTAL CANCER (HCC): ICD-10-CM

## 2020-05-28 LAB
ALBUMIN SERPL-MCNC: 4.8 G/DL (ref 3.5–5.2)
ALBUMIN/GLOB SERPL: 1.7 G/DL
ALP SERPL-CCNC: 74 U/L (ref 39–117)
ALT SERPL W P-5'-P-CCNC: 44 U/L (ref 1–41)
ANION GAP SERPL CALCULATED.3IONS-SCNC: 9 MMOL/L (ref 5–15)
AST SERPL-CCNC: 28 U/L (ref 1–40)
BASOPHILS # BLD AUTO: 0.04 10*3/MM3 (ref 0–0.2)
BASOPHILS NFR BLD AUTO: 0.9 % (ref 0–1.5)
BILIRUB SERPL-MCNC: 0.9 MG/DL (ref 0.1–1.2)
BUN BLD-MCNC: 10 MG/DL (ref 6–20)
BUN/CREAT SERPL: 12.8 (ref 7–25)
CALCIUM SPEC-SCNC: 9.3 MG/DL (ref 8.6–10.5)
CEA SERPL-MCNC: 1.47 NG/ML
CHLORIDE SERPL-SCNC: 105 MMOL/L (ref 98–107)
CO2 SERPL-SCNC: 28 MMOL/L (ref 22–29)
CREAT BLD-MCNC: 0.78 MG/DL (ref 0.76–1.27)
DEPRECATED RDW RBC AUTO: 40.2 FL (ref 37–54)
EOSINOPHIL # BLD AUTO: 0.1 10*3/MM3 (ref 0–0.4)
EOSINOPHIL NFR BLD AUTO: 2.2 % (ref 0.3–6.2)
ERYTHROCYTE [DISTWIDTH] IN BLOOD BY AUTOMATED COUNT: 12.1 % (ref 12.3–15.4)
GFR SERPL CREATININE-BSD FRML MDRD: 104 ML/MIN/1.73
GLOBULIN UR ELPH-MCNC: 2.8 GM/DL
GLUCOSE BLD-MCNC: 95 MG/DL (ref 65–99)
HCT VFR BLD AUTO: 42.3 % (ref 37.5–51)
HGB BLD-MCNC: 14.6 G/DL (ref 13–17.7)
IMM GRANULOCYTES # BLD AUTO: 0 10*3/MM3 (ref 0–0.05)
IMM GRANULOCYTES NFR BLD AUTO: 0 % (ref 0–0.5)
LYMPHOCYTES # BLD AUTO: 1.19 10*3/MM3 (ref 0.7–3.1)
LYMPHOCYTES NFR BLD AUTO: 26.1 % (ref 19.6–45.3)
MCH RBC QN AUTO: 31.4 PG (ref 26.6–33)
MCHC RBC AUTO-ENTMCNC: 34.5 G/DL (ref 31.5–35.7)
MCV RBC AUTO: 91 FL (ref 79–97)
MONOCYTES # BLD AUTO: 0.43 10*3/MM3 (ref 0.1–0.9)
MONOCYTES NFR BLD AUTO: 9.4 % (ref 5–12)
NEUTROPHILS # BLD AUTO: 2.8 10*3/MM3 (ref 1.7–7)
NEUTROPHILS NFR BLD AUTO: 61.4 % (ref 42.7–76)
NRBC BLD AUTO-RTO: 0 /100 WBC (ref 0–0.2)
PLATELET # BLD AUTO: 203 10*3/MM3 (ref 140–450)
PMV BLD AUTO: 9 FL (ref 6–12)
POTASSIUM BLD-SCNC: 3.8 MMOL/L (ref 3.5–5.2)
PROT SERPL-MCNC: 7.6 G/DL (ref 6–8.5)
RBC # BLD AUTO: 4.65 10*6/MM3 (ref 4.14–5.8)
SODIUM BLD-SCNC: 142 MMOL/L (ref 136–145)
WBC NRBC COR # BLD: 4.56 10*3/MM3 (ref 3.4–10.8)

## 2020-05-28 PROCEDURE — 80053 COMPREHEN METABOLIC PANEL: CPT | Performed by: INTERNAL MEDICINE

## 2020-05-28 PROCEDURE — 82565 ASSAY OF CREATININE: CPT

## 2020-05-28 PROCEDURE — 74177 CT ABD & PELVIS W/CONTRAST: CPT

## 2020-05-28 PROCEDURE — 85025 COMPLETE CBC W/AUTO DIFF WBC: CPT | Performed by: INTERNAL MEDICINE

## 2020-05-28 PROCEDURE — 71260 CT THORAX DX C+: CPT

## 2020-05-28 PROCEDURE — 82378 CARCINOEMBRYONIC ANTIGEN: CPT | Performed by: INTERNAL MEDICINE

## 2020-05-28 PROCEDURE — 25010000002 IOPAMIDOL 61 % SOLUTION: Performed by: INTERNAL MEDICINE

## 2020-05-28 PROCEDURE — 0 DIATRIZOATE MEGLUMINE & SODIUM PER 1 ML: Performed by: INTERNAL MEDICINE

## 2020-05-28 RX ADMIN — IOPAMIDOL 90 ML: 612 INJECTION, SOLUTION INTRAVENOUS at 14:35

## 2020-05-28 RX ADMIN — DIATRIZOATE MEGLUMINE AND DIATRIZOATE SODIUM 30 ML: 660; 100 LIQUID ORAL; RECTAL at 13:40

## 2020-05-29 LAB — CREAT BLDA-MCNC: 0.6 MG/DL (ref 0.6–1.3)

## 2020-06-04 ENCOUNTER — LAB (OUTPATIENT)
Dept: LAB | Facility: HOSPITAL | Age: 55
End: 2020-06-04

## 2020-06-04 ENCOUNTER — OFFICE VISIT (OUTPATIENT)
Dept: ONCOLOGY | Facility: CLINIC | Age: 55
End: 2020-06-04

## 2020-06-04 VITALS
OXYGEN SATURATION: 98 % | TEMPERATURE: 98 F | HEIGHT: 70 IN | BODY MASS INDEX: 23.79 KG/M2 | SYSTOLIC BLOOD PRESSURE: 112 MMHG | HEART RATE: 62 BPM | DIASTOLIC BLOOD PRESSURE: 68 MMHG | WEIGHT: 166.2 LBS | RESPIRATION RATE: 16 BRPM

## 2020-06-04 DIAGNOSIS — E53.8 B12 DEFICIENCY: ICD-10-CM

## 2020-06-04 DIAGNOSIS — C20 RECTAL CANCER (HCC): Primary | ICD-10-CM

## 2020-06-04 PROCEDURE — 99214 OFFICE O/P EST MOD 30 MIN: CPT | Performed by: INTERNAL MEDICINE

## 2020-11-09 RX ORDER — ESCITALOPRAM OXALATE 20 MG/1
TABLET ORAL
Qty: 90 TABLET | Refills: 1 | Status: SHIPPED | OUTPATIENT
Start: 2020-11-09 | End: 2021-05-06

## 2020-11-11 ENCOUNTER — TELEPHONE (OUTPATIENT)
Dept: ONCOLOGY | Facility: CLINIC | Age: 55
End: 2020-11-11

## 2020-11-11 NOTE — TELEPHONE ENCOUNTER
Caller: MILLIE MENDEZ    Relationship to patient: SELF    Best call back number: [phone]     PT IS REQUESTING THAT THE APPT FOR HIS LABS ON 11/19 BE DONE AT THE SAME TIME AS HIS APPT FOR CT SCANS TOMORROW 11/12. PT WILL KEEP HIS APPT WITH DR TURNER ON 11/19

## 2020-11-12 ENCOUNTER — HOSPITAL ENCOUNTER (OUTPATIENT)
Dept: PET IMAGING | Facility: HOSPITAL | Age: 55
Discharge: HOME | End: 2020-11-12
Admitting: INTERNAL MEDICINE

## 2020-11-12 ENCOUNTER — LAB (OUTPATIENT)
Dept: LAB | Facility: HOSPITAL | Age: 55
End: 2020-11-12

## 2020-11-12 DIAGNOSIS — C20 RECTAL CANCER (HCC): ICD-10-CM

## 2020-11-12 DIAGNOSIS — E53.8 B12 DEFICIENCY: ICD-10-CM

## 2020-11-12 LAB
ALBUMIN SERPL-MCNC: 4.6 G/DL (ref 3.5–5.2)
ALBUMIN/GLOB SERPL: 1.9 G/DL (ref 1.1–2.4)
ALP SERPL-CCNC: 72 U/L (ref 38–116)
ALT SERPL W P-5'-P-CCNC: 43 U/L (ref 0–41)
ANION GAP SERPL CALCULATED.3IONS-SCNC: 11.4 MMOL/L (ref 5–15)
AST SERPL-CCNC: 36 U/L (ref 0–40)
BASOPHILS # BLD AUTO: 0.05 10*3/MM3 (ref 0–0.2)
BASOPHILS NFR BLD AUTO: 1 % (ref 0–1.5)
BILIRUB SERPL-MCNC: 0.6 MG/DL (ref 0.2–1.2)
BUN SERPL-MCNC: 16 MG/DL (ref 6–20)
BUN/CREAT SERPL: 18.6 (ref 7.3–30)
CALCIUM SPEC-SCNC: 10.1 MG/DL (ref 8.5–10.2)
CEA SERPL-MCNC: 1.77 NG/ML
CHLORIDE SERPL-SCNC: 98 MMOL/L (ref 98–107)
CO2 SERPL-SCNC: 29.6 MMOL/L (ref 22–29)
CREAT BLDA-MCNC: 1 MG/DL (ref 0.6–1.3)
CREAT SERPL-MCNC: 0.86 MG/DL (ref 0.7–1.3)
DEPRECATED RDW RBC AUTO: 41.4 FL (ref 37–54)
EOSINOPHIL # BLD AUTO: 0.14 10*3/MM3 (ref 0–0.4)
EOSINOPHIL NFR BLD AUTO: 2.7 % (ref 0.3–6.2)
ERYTHROCYTE [DISTWIDTH] IN BLOOD BY AUTOMATED COUNT: 12 % (ref 12.3–15.4)
GFR SERPL CREATININE-BSD FRML MDRD: 93 ML/MIN/1.73
GLOBULIN UR ELPH-MCNC: 2.4 GM/DL (ref 1.8–3.5)
GLUCOSE SERPL-MCNC: 80 MG/DL (ref 74–124)
HCT VFR BLD AUTO: 44.8 % (ref 37.5–51)
HGB BLD-MCNC: 15.1 G/DL (ref 13–17.7)
IMM GRANULOCYTES # BLD AUTO: 0.01 10*3/MM3 (ref 0–0.05)
IMM GRANULOCYTES NFR BLD AUTO: 0.2 % (ref 0–0.5)
LYMPHOCYTES # BLD AUTO: 1.79 10*3/MM3 (ref 0.7–3.1)
LYMPHOCYTES NFR BLD AUTO: 34.4 % (ref 19.6–45.3)
MCH RBC QN AUTO: 31.9 PG (ref 26.6–33)
MCHC RBC AUTO-ENTMCNC: 33.7 G/DL (ref 31.5–35.7)
MCV RBC AUTO: 94.5 FL (ref 79–97)
MONOCYTES # BLD AUTO: 0.62 10*3/MM3 (ref 0.1–0.9)
MONOCYTES NFR BLD AUTO: 11.9 % (ref 5–12)
NEUTROPHILS NFR BLD AUTO: 2.59 10*3/MM3 (ref 1.7–7)
NEUTROPHILS NFR BLD AUTO: 49.8 % (ref 42.7–76)
NRBC BLD AUTO-RTO: 0 /100 WBC (ref 0–0.2)
PLATELET # BLD AUTO: 255 10*3/MM3 (ref 140–450)
PMV BLD AUTO: 9.5 FL (ref 6–12)
POTASSIUM SERPL-SCNC: 5.1 MMOL/L (ref 3.5–4.7)
PROT SERPL-MCNC: 7 G/DL (ref 6.3–8)
RBC # BLD AUTO: 4.74 10*6/MM3 (ref 4.14–5.8)
SODIUM SERPL-SCNC: 139 MMOL/L (ref 134–145)
VIT B12 BLD-MCNC: 285 PG/ML (ref 211–946)
WBC # BLD AUTO: 5.2 10*3/MM3 (ref 3.4–10.8)

## 2020-11-12 PROCEDURE — 82565 ASSAY OF CREATININE: CPT

## 2020-11-12 PROCEDURE — 85025 COMPLETE CBC W/AUTO DIFF WBC: CPT

## 2020-11-12 PROCEDURE — 25010000002 IOPAMIDOL 61 % SOLUTION: Performed by: INTERNAL MEDICINE

## 2020-11-12 PROCEDURE — 0 DIATRIZOATE MEGLUMINE & SODIUM PER 1 ML: Performed by: INTERNAL MEDICINE

## 2020-11-12 PROCEDURE — 80053 COMPREHEN METABOLIC PANEL: CPT

## 2020-11-12 PROCEDURE — 82607 VITAMIN B-12: CPT | Performed by: INTERNAL MEDICINE

## 2020-11-12 PROCEDURE — 71260 CT THORAX DX C+: CPT

## 2020-11-12 PROCEDURE — 36415 COLL VENOUS BLD VENIPUNCTURE: CPT

## 2020-11-12 PROCEDURE — 82378 CARCINOEMBRYONIC ANTIGEN: CPT | Performed by: INTERNAL MEDICINE

## 2020-11-12 PROCEDURE — 74177 CT ABD & PELVIS W/CONTRAST: CPT

## 2020-11-12 RX ADMIN — DIATRIZOATE MEGLUMINE AND DIATRIZOATE SODIUM 30 ML: 660; 100 LIQUID ORAL; RECTAL at 08:13

## 2020-11-12 RX ADMIN — IOPAMIDOL 85 ML: 612 INJECTION, SOLUTION INTRAVENOUS at 09:10

## 2020-11-18 ENCOUNTER — TELEPHONE (OUTPATIENT)
Dept: ONCOLOGY | Facility: CLINIC | Age: 55
End: 2020-11-18

## 2020-11-18 NOTE — TELEPHONE ENCOUNTER
Called pt re: the need to reschedule his appt for tomorrow. Reviewed CT scan results with pt and he v[facility] Will reschedule patient after speaking with Dr. Pardo next week.

## 2020-11-19 ENCOUNTER — APPOINTMENT (OUTPATIENT)
Dept: LAB | Facility: HOSPITAL | Age: 55
End: 2020-11-19

## 2020-11-23 DIAGNOSIS — C20 RECTAL CANCER (HCC): Primary | ICD-10-CM

## 2020-11-23 DIAGNOSIS — E53.8 B12 DEFICIENCY: ICD-10-CM

## 2020-11-25 ENCOUNTER — TELEPHONE (OUTPATIENT)
Dept: ONCOLOGY | Facility: CLINIC | Age: 55
End: 2020-11-25

## 2020-11-25 NOTE — TELEPHONE ENCOUNTER
----- Message from Violet Barrow sent at 11/24/2020  4:59 PM EST -----  Regarding: FW: when to r/s?  Please schedule scans and labs in 6mths and see dr jacobs 1 week later with no labs.    Thanks!!!  ----- Message -----  From: Rakesh Pardo MD  Sent: 11/23/2020   8:55 AM EST  To: Violet Barrow  Subject: RE: when to r/s?                                 6 mo same scans and labs cea  ----- Message -----  From: Violet Barrow  Sent: 11/20/2020   1:41 PM EST  To: Rakesh Pardo MD  Subject: when to r/s?                                     Cora gave patient scan results over phone. When do you want return appt and do you want scan week before?      Ron

## 2020-11-30 DIAGNOSIS — C20 RECTAL CANCER (HCC): Primary | ICD-10-CM

## 2021-03-26 ENCOUNTER — BULK ORDERING (OUTPATIENT)
Dept: CASE MANAGEMENT | Facility: OTHER | Age: 56
End: 2021-03-26

## 2021-03-26 DIAGNOSIS — Z23 IMMUNIZATION DUE: ICD-10-CM

## 2021-03-27 ENCOUNTER — IMMUNIZATION (OUTPATIENT)
Dept: VACCINE CLINIC | Facility: HOSPITAL | Age: 56
End: 2021-03-27

## 2021-03-27 DIAGNOSIS — Z23 IMMUNIZATION DUE: ICD-10-CM

## 2021-03-27 PROCEDURE — 91300 HC SARSCOV02 VAC 30MCG/0.3ML IM: CPT | Performed by: INTERNAL MEDICINE

## 2021-03-27 PROCEDURE — 0001A: CPT | Performed by: INTERNAL MEDICINE

## 2021-04-17 ENCOUNTER — IMMUNIZATION (OUTPATIENT)
Dept: VACCINE CLINIC | Facility: HOSPITAL | Age: 56
End: 2021-04-17

## 2021-04-17 PROCEDURE — 91300 HC SARSCOV02 VAC 30MCG/0.3ML IM: CPT | Performed by: INTERNAL MEDICINE

## 2021-04-17 PROCEDURE — 0002A: CPT | Performed by: INTERNAL MEDICINE

## 2021-05-05 ENCOUNTER — HOSPITAL ENCOUNTER (OUTPATIENT)
Dept: PET IMAGING | Facility: HOSPITAL | Age: 56
Discharge: HOME | End: 2021-05-05
Admitting: INTERNAL MEDICINE

## 2021-05-05 ENCOUNTER — LAB (OUTPATIENT)
Dept: LAB | Facility: HOSPITAL | Age: 56
End: 2021-05-05

## 2021-05-05 DIAGNOSIS — C20 RECTAL CANCER (HCC): ICD-10-CM

## 2021-05-05 DIAGNOSIS — E53.8 B12 DEFICIENCY: ICD-10-CM

## 2021-05-05 LAB
ALBUMIN SERPL-MCNC: 4.3 G/DL (ref 3.5–5.2)
ALBUMIN/GLOB SERPL: 1.6 G/DL (ref 1.1–2.4)
ALP SERPL-CCNC: 79 U/L (ref 38–116)
ALT SERPL W P-5'-P-CCNC: 35 U/L (ref 0–41)
ANION GAP SERPL CALCULATED.3IONS-SCNC: 9.9 MMOL/L (ref 5–15)
AST SERPL-CCNC: 27 U/L (ref 0–40)
BASOPHILS # BLD AUTO: 0.06 10*3/MM3 (ref 0–0.2)
BASOPHILS NFR BLD AUTO: 1.1 % (ref 0–1.5)
BILIRUB SERPL-MCNC: 0.4 MG/DL (ref 0.2–1.2)
BUN SERPL-MCNC: 11 MG/DL (ref 6–20)
BUN/CREAT SERPL: 12.6 (ref 7.3–30)
CALCIUM SPEC-SCNC: 9.2 MG/DL (ref 8.5–10.2)
CEA SERPL-MCNC: 2.01 NG/ML
CHLORIDE SERPL-SCNC: 101 MMOL/L (ref 98–107)
CO2 SERPL-SCNC: 28.1 MMOL/L (ref 22–29)
CREAT BLDA-MCNC: 0.9 MG/DL (ref 0.6–1.3)
CREAT SERPL-MCNC: 0.87 MG/DL (ref 0.7–1.3)
DEPRECATED RDW RBC AUTO: 40.8 FL (ref 37–54)
EOSINOPHIL # BLD AUTO: 0.16 10*3/MM3 (ref 0–0.4)
EOSINOPHIL NFR BLD AUTO: 2.9 % (ref 0.3–6.2)
ERYTHROCYTE [DISTWIDTH] IN BLOOD BY AUTOMATED COUNT: 11.9 % (ref 12.3–15.4)
GFR SERPL CREATININE-BSD FRML MDRD: 91 ML/MIN/1.73
GLOBULIN UR ELPH-MCNC: 2.7 GM/DL (ref 1.8–3.5)
GLUCOSE SERPL-MCNC: 80 MG/DL (ref 74–124)
HCT VFR BLD AUTO: 40.9 % (ref 37.5–51)
HGB BLD-MCNC: 14 G/DL (ref 13–17.7)
IMM GRANULOCYTES # BLD AUTO: 0.01 10*3/MM3 (ref 0–0.05)
IMM GRANULOCYTES NFR BLD AUTO: 0.2 % (ref 0–0.5)
LYMPHOCYTES # BLD AUTO: 2.2 10*3/MM3 (ref 0.7–3.1)
LYMPHOCYTES NFR BLD AUTO: 39.9 % (ref 19.6–45.3)
MCH RBC QN AUTO: 32 PG (ref 26.6–33)
MCHC RBC AUTO-ENTMCNC: 34.2 G/DL (ref 31.5–35.7)
MCV RBC AUTO: 93.4 FL (ref 79–97)
MONOCYTES # BLD AUTO: 0.63 10*3/MM3 (ref 0.1–0.9)
MONOCYTES NFR BLD AUTO: 11.4 % (ref 5–12)
NEUTROPHILS NFR BLD AUTO: 2.45 10*3/MM3 (ref 1.7–7)
NEUTROPHILS NFR BLD AUTO: 44.5 % (ref 42.7–76)
NRBC BLD AUTO-RTO: 0 /100 WBC (ref 0–0.2)
PLATELET # BLD AUTO: 250 10*3/MM3 (ref 140–450)
PMV BLD AUTO: 9.3 FL (ref 6–12)
POTASSIUM SERPL-SCNC: 3.8 MMOL/L (ref 3.5–4.7)
PROT SERPL-MCNC: 7 G/DL (ref 6.3–8)
RBC # BLD AUTO: 4.38 10*6/MM3 (ref 4.14–5.8)
SODIUM SERPL-SCNC: 139 MMOL/L (ref 134–145)
WBC # BLD AUTO: 5.51 10*3/MM3 (ref 3.4–10.8)

## 2021-05-05 PROCEDURE — 85025 COMPLETE CBC W/AUTO DIFF WBC: CPT

## 2021-05-05 PROCEDURE — 36415 COLL VENOUS BLD VENIPUNCTURE: CPT

## 2021-05-05 PROCEDURE — 0 DIATRIZOATE MEGLUMINE & SODIUM PER 1 ML: Performed by: INTERNAL MEDICINE

## 2021-05-05 PROCEDURE — 74177 CT ABD & PELVIS W/CONTRAST: CPT

## 2021-05-05 PROCEDURE — 71260 CT THORAX DX C+: CPT

## 2021-05-05 PROCEDURE — 82565 ASSAY OF CREATININE: CPT

## 2021-05-05 PROCEDURE — 82378 CARCINOEMBRYONIC ANTIGEN: CPT | Performed by: INTERNAL MEDICINE

## 2021-05-05 PROCEDURE — 80053 COMPREHEN METABOLIC PANEL: CPT

## 2021-05-05 PROCEDURE — 25010000002 IOPAMIDOL 61 % SOLUTION: Performed by: INTERNAL MEDICINE

## 2021-05-05 RX ADMIN — IOPAMIDOL 85 ML: 612 INJECTION, SOLUTION INTRAVENOUS at 09:43

## 2021-05-05 RX ADMIN — DIATRIZOATE MEGLUMINE AND DIATRIZOATE SODIUM 30 ML: 660; 100 LIQUID ORAL; RECTAL at 09:00

## 2021-05-06 RX ORDER — ESCITALOPRAM OXALATE 20 MG/1
TABLET ORAL
Qty: 90 TABLET | Refills: 1 | Status: SHIPPED | OUTPATIENT
Start: 2021-05-06 | End: 2021-11-29 | Stop reason: SDUPTHER

## 2021-05-13 ENCOUNTER — TELEPHONE (OUTPATIENT)
Dept: ONCOLOGY | Facility: CLINIC | Age: 56
End: 2021-05-13

## 2021-05-13 NOTE — TELEPHONE ENCOUNTER
Caller: MILLIE MENDEZ    Relationship to patient: SELF    Best call back number: [phone]    Chief complaint: PT MISSED HIS APPT TODAY AND IS CALLING TO RESCHEDULE    Type of visit: VITALS AND FOLLOW UP    Requested date: NEXT AVAILABLE     If rescheduling, when is the original appointment: 05/13/21

## 2021-05-19 ENCOUNTER — TELEPHONE (OUTPATIENT)
Dept: ONCOLOGY | Facility: CLINIC | Age: 56
End: 2021-05-19

## 2021-05-19 NOTE — TELEPHONE ENCOUNTER
PATIENT CALLED AND STATED HE DOESN'T NEED THE SCHEDULED LAB AS HE GETS THEM WITH HIS CT.   HE ALSO WOULD LIKE TO KNOW IF HE COULD GET THE 6-8- APPTS MOVED TO AN EARLIER DAY   PLEASE CALL AND ADVISE AND YES TO [facility]   THANKYOU     [phone]

## 2021-05-25 ENCOUNTER — APPOINTMENT (OUTPATIENT)
Dept: LAB | Facility: HOSPITAL | Age: 56
End: 2021-05-25

## 2021-06-08 ENCOUNTER — APPOINTMENT (OUTPATIENT)
Dept: LAB | Facility: HOSPITAL | Age: 56
End: 2021-06-08

## 2021-06-08 ENCOUNTER — OFFICE VISIT (OUTPATIENT)
Dept: ONCOLOGY | Facility: CLINIC | Age: 56
End: 2021-06-08

## 2021-06-08 VITALS
OXYGEN SATURATION: 97 % | HEIGHT: 70 IN | BODY MASS INDEX: 23.99 KG/M2 | DIASTOLIC BLOOD PRESSURE: 76 MMHG | TEMPERATURE: 97.8 F | WEIGHT: 167.6 LBS | HEART RATE: 61 BPM | RESPIRATION RATE: 16 BRPM | SYSTOLIC BLOOD PRESSURE: 112 MMHG

## 2021-06-08 DIAGNOSIS — Z15.09 LYNCH SYNDROME: ICD-10-CM

## 2021-06-08 DIAGNOSIS — C20 RECTAL CANCER (HCC): Primary | ICD-10-CM

## 2021-06-08 PROCEDURE — 99214 OFFICE O/P EST MOD 30 MIN: CPT | Performed by: INTERNAL MEDICINE

## 2021-06-08 NOTE — PROGRESS NOTES
Subjective    REASONS FOR FOLLOW-UP :  1.  Rectal cancer T3N1M0 treated with neoadjuvant chemoradiation  2. ypT3N0 at surgery 5/17 with a positive radial margin reexcised and treated with radiation boost to the right pelvic sidewall postoperatively  3.  Adjuvant FOLFOX 6 x 8 completed in 10/16  4.  Additional surgery in 11/16 with colostomy reversal  5.  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  Unexplained small bilateral pleural effusions first noted in 5/17 not seen in 11/16 CAT scan of the chest resolved in 10/17-recurred in 4/18    History of Present Illness    Mr. Viera is a 54year-old man returning today for follow-up for rectal cancer.  Genetic testing wis consistent with Mathias syndrome with a PMS 2 mutation which is associated with lower risk of subsequent colon cancer then the MLH1 and 2 mutations    .  Continues on aspirin 650 daily for polyp prevention    He is here to review his CAT scans and small bilateral pleural effusions have persisted now for 4 years and I have no good cause for this.  Stability is reassuring-pancreas appears normal.  CEA is 2.0    His colonoscopy and EGD were done in May and benign with just a hyperplastic polyp and gastritis     he does have issues with obstructive sleep apnea and has not dealt with it     His B12 levels were low at some point we will recheck it when he comes back in 6 months.  We discussed in length follow-up after the 5-year.  I told him at this point there is no routine recommendations for imaging of the pancreas chronically and we will see how the guidelines change    Active Ambulatory Problems     Diagnosis Date Noted   • Rectal cancer (CMS/HCC) 03/30/2016   • B12 deficiency 10/18/2016   • Mathias syndrome 06/08/2021     Resolved Ambulatory Problems     Diagnosis Date Noted   • No Resolved Ambulatory Problems     Past Medical History:   Diagnosis Date   • Anxiety    • Chest pain    • Colon cancer (CMS/HCC) 2016   •  Difficulty in urination    • History of sleep apnea    • Ileostomy in place (CMS/HCC)    • Seasonal allergies    • Urinary frequency    • Urinary incontinence    • Vitamin B 12 deficiency 10/18/2016     Past Surgical History:   Procedure Laterality Date   • COLONOSCOPY W/ BIOPSIES N/A 2016    Dr. Tree Avila- MALIGNANT    • CYSTOSCOPY URETHROTOMY VISUAL INTERNAL N/A 7/28/2016    Procedure: dilation of uretheral stricture and catheter placement;  Surgeon: Richard Boston MD;  Location: [facility] OR;  Service:    • FOOT SURGERY Left 1983    Benign cyst left foot   • [place] INSJ TUNNELED CVC W/O SUBQ PORT/ AGE 7 YR/> Left 6/22/2016    Procedure: Power Port Placement;  Surgeon: Keely Bermudez MD;  Location: [facility];  Service: General   • SINUS SURGERY  1996   • TRANSANAL RECTAL RESECTION N/A 05/09/2016    Rectal resection and hand-sewn colon-pouch-anal anastomosis with splenic flexure mobilization, and clipping of right pelvic sidewall for postoperative radiation boost, omental pedicle, Dr. Emily Aguilera         Oncology/Hematology History Overview Note   1.  Rectal cancer diagnosed January 2016 after presenting with a several month history of rectal bleeding, diarrhea and 10 lb weight loss; clinical stage T3N1M0  2.  Completed neoadjuvant Xeloda with radiation (5040cGy) 3/2/16  3. Surgery May 9 2016  pathology revealed a residual 5cm invasive mucinous adenocarcinoma, low grade, invading through the muscularis propria and into the perirectal adipose tissue with an initial positive radial margin. There was no evidence of lymphvascular space invasion and thirteen lymph nodes were negative for metastatic involvement. Additional distal and radial margin re-excision specimens were negative for carcinoma and excision of right pelvic lateral sidewall was negative for carcinoma. According to Dr. Aguilera's operative report, the rectum/rectal tumor was densely adhered to the right pelvic side wall and she  placed clips in this area for possible adjuvant radiation boost. The initial frozen margins came back negative but due to her concern, she submitted additional right lateral and distal margins and according to the pathology report these additional specimens were negative for carcinoma. However, a comment on the path report states that an permanent sections an additional section of tumor bed was taken, revealing invasive carcinoma that focally extended to the radial margin and this positive margin was within 5mm fo the area taken on frozen section and it is unclear whether the additional radial margin would have covered this; he completed a post-surgery radiation boost to the right pelvic sidewall  4.  Initiated adjuvant FOLFOX-6 6/29/16 and 8 cycles completed 10/18/16 with dose reductions secondary to neuropathy and diarrhea     Rectal cancer (CMS/HCC)   1/19/2016 Initial Diagnosis    Rectal cancer     5/17   He was treated neoadjuvantly with Xeloda and radiation to a dose of 5040 cGy completed in early March 2016.  He then underwent surgical resection by Dr. Aguilera with final pathology showing a low-grade mucinous adenocarcinoma T3 N0 M0 with a questionable positive radial pelvic sidewall margin.  Surgical clips were placed in the area of concern in the pelvic sidewall, and he completed a radiation boost to this area.  The tumor had a regression score of 2 and histologic features suggestive of MSI; immunohistochemistry of tumor cells was positive for MLH-1, MSH-2, and MSH-6.  He completed 8 cycles of adjuvant FOLFOX 6/10/1816 with dose reductions for neuropathy and diarrhea.    Patient returns today to review his CAT scans and overall is doing fairly well.  He has no cough or shortness of breath weight and appetite are stable.  His CEA was 1.92 which is normal.  CAT scans show minimal small pleural effusions which were not seen in November prior to his second surgery.  There are very small and not tappable and I  suspect there residual from his surgery and I will try and get some x-[facility] from [facility] if they were done postoperatively.  I recommended we repeat his chest CT in 3 months to make sure there is resolution and no progression of these findings and overall I'm not very worried that these are related to metastatic disease.    He has had blood drawn for genetic testing consistent with Mathias syndrome with a PMS 2 mutation and the VUS in the DICER gene      SOCIAL HISTORY: He is , has 1 daughter. He is currently employed as a . He has never smoked. Drinks 2-3 glasses of wine per month.  Denies illicit drug use or HIV risk factors.     FAMILY HISTORY: His maternal grandmother had colon cancer in her 70s. No other malignancies in the family. His father had heart disease, hypertension and diabetes.         Review of Systems   Constitutional: Negative for activity change, chills, fatigue and unexpected weight change.   HENT: Negative for trouble swallowing.    Respiratory: Negative for cough, choking, chest tightness, shortness of breath and wheezing.    Cardiovascular: Negative for chest pain and leg swelling.   Gastrointestinal: Negative for abdominal pain, diarrhea, nausea and rectal pain.   Musculoskeletal: Positive for back pain (stable 6/4/2020). Negative for arthralgias, joint swelling and myalgias.   Skin: Negative for color change.   Neurological: Negative for dizziness, weakness and headaches.   Psychiatric/Behavioral: Positive for sleep disturbance (6/4/2020). The patient is not nervous/anxious.    All other systems reviewed and are negative.     A comprehensive 14 point review of systems was performed and was negative except as mentioned.    Medications:  The current medication list was reviewed in the EMR    ALLERGIES:  No Known Allergies    Objective      Vitals:    06/08/21 1048   BP: 112/76   Pulse: 61   Resp: 16   Temp: 97.8 °F (36.6 °C)   TempSrc: Skin  "  SpO2: 97%   Weight: 76 kg (167 lb 9.6 oz)   Height: 178.5 cm (70.28\")   PainSc: 0-No pain     Current Status 6/8/2021   ECOG score 0       Physical Exam    GENERAL:  Well-developed, well-nourished in no acute distress.   SKIN:  Warm, dry without rashes, purpura or petechiae.  NECK:  Supple with good range of motion; no thyromegaly or masses, no JVD.  LYMPHATICS:  No cervical, supraclavicular,   CARDIAC: Regular rate and rhythm no murmurs  RESP: Clear  ABDOMEN:  Not examined  EXTREMITIES:  No clubbing, cyanosis or edema.Eczema on both hands without any joint changes  PSYCHIATRIC:  Normal affect and mood.  '    RECENT LABS:  Hematology WBC   Date Value Ref Range Status   05/05/2021 5.51 3.40 - 10.80 10*3/mm3 Final     RBC   Date Value Ref Range Status   05/05/2021 4.38 4.14 - 5.80 10*6/mm3 Final     Hemoglobin   Date Value Ref Range Status   05/05/2021 14.0 13.0 - 17.7 g/dL Final     Hematocrit   Date Value Ref Range Status   05/05/2021 40.9 37.5 - 51.0 % Final     Platelets   Date Value Ref Range Status   05/05/2021 250 140 - 450 10*3/mm3 Final     Lab Results   Component Value Date    GLUCOSE 80 05/05/2021    BUN 11 05/05/2021    CREATININE 0.90 05/05/2021    EGFRIFNONA 91 05/05/2021    EGFRIFAFRI  09/20/2016      Comment:      <15 Indicative of kidney failure.    BCR 12.6 05/05/2021    K 3.8 05/05/2021    CO2 28.1 05/05/2021    CALCIUM 9.2 05/05/2021    ALBUMIN 4.30 05/05/2021    AST 27 05/05/2021    ALT 35 05/05/2021     cea 1.61       Ct CAP  FINDINGS:  CT CHEST: The visualized thyroid gland is normal. No pathological  mediastinal or hilar lymphadenopathy. No pericardial effusion is seen.  The central airways are patent without endobronchial lesion. There are  tiny bilateral layering effusions present. These are without interval  change from prior imaging. No suspicious pulmonary nodule or focal  airspace disease is present. No pathological axillary lymphadenopathy.     CT ABDOMEN AND PELVIS: The liver " demonstrates normal attenuation. No  focal hepatic lesion is seen. The spleen, gallbladder and the pancreas  is normal. Bilateral adrenal glands are normal. Both kidneys are normal  in size and attenuation. Small exophytic cortical cyst is seen at the  superior pole of the left kidney. The urinary bladder is minimally  distended with mild circumferential wall thickening. A primary  anastomosis is seen in the rectal region that appears unremarkable. No  evidence of bowel obstruction. A moderate to large amount of stool is  seen throughout the colon. There is no pathological retroperitoneal  lymphadenopathy.     IMPRESSION:  1. No convincing evidence of local recurrence or metastatic disease  within the chest, abdomen or pelvis.  2. Additional findings as above.           CT OF THE CHEST ABDOMEN AND PELVIS WITH CONTRAST 09/18/2019   IMPRESSION:  1. Tiny bilateral pleural effusions appear stable.  2. No new evidence of metastatic disease on this CT of the chest,  abdomen and pelvis.     This report was finalized on 9/19/2019 10:59 AM by Dr. Sampson Ridley M.D.    CT CHEST, ABDOMEN AND PELVIS WITH CONTRAST     IMPRESSION:  Stable exam. No convincing evidence of new metastatic  disease within the chest, abdomen or pelvis.      This report was finalized on 5/29/2020 3:08 PM by Dr. Margi Quinteros M.D.      Assessment/Plan   1.t neoadjuvant chemoradiation for a clinical T3 N1 M0 rectal cancer.  At the time of surgery final pathology showed YT3 N0 M0 disease with 13 negative lymph nodes.  There was a questionable positive radial margin involving the right pelvic sidewall and he completed a radiation boost to this area. He completed 8 cycles of FOLFOX 6 with dose reductions in October 2016.    · CAT scans negative as of 5/20/2020 with normal CEA  2.  Reversal of colostomy in 11/16  3.  Follow-up CAT scans and 5/17 showed bilateral very small pleural effusions of unknown etiology-Resolved in 11/17-back in 2018 but  stable  4. mild peripheral neuropathy   5. Mathias syndrome PMS 2 gene mutation On aspirin 650 mg    Plan  Return in 6 months with CEA and CAT scans and that will be 5 years  He may continue annual visits with us because of his Mathias syndrome diagnosis    25 minutes, over half of that time counseling.  I reviewed the recommendations in up-to-date about screening for Mathias syndrome.  And reviewed his scans and path reports from [facility]                6/8/2021      CC:

## 2021-08-03 ENCOUNTER — TELEMEDICINE (OUTPATIENT)
Dept: FAMILY MEDICINE CLINIC | Facility: TELEHEALTH | Age: 56
End: 2021-08-03
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
## 2021-08-03 PROCEDURE — 99203 OFFICE O/P NEW LOW 30 MIN: CPT | Performed by: NURSE PRACTITIONER

## 2021-08-03 RX ORDER — LEVOCETIRIZINE DIHYDROCHLORIDE 5 MG/1
5 TABLET, FILM COATED ORAL EVERY EVENING
Qty: 7 TABLET | Refills: 0 | Status: SHIPPED | OUTPATIENT
Start: 2021-08-03 | End: 2022-11-04

## 2021-08-03 RX ORDER — TRIAMCINOLONE ACETONIDE 1 MG/G
CREAM TOPICAL 2 TIMES DAILY
Qty: 15 G | Refills: 0 | Status: SHIPPED | OUTPATIENT
Start: 2021-08-03 | End: 2021-08-10

## 2021-08-03 NOTE — PROGRESS NOTES
"CHIEF COMPLAINT  Chief Complaint   Patient presents with   • Rash         HPI  Dane Viera is a 56 y.o. male  presents with complaint of possible insect bites (2) to right posterior neck, just below ear and jawline, and  one on right upper arm near elbow. Patient states he believes they occurred last night as he was leaving his parents house. He states he thought he felt something bite him but wasn't sure. He states later in the evening he noticed some itching on his neck. He states he noticed the spot on his arm this morning and that both places (neck and arm) have been very itchy throughout the day. He states he has been using hydrocortisone throughout the day but it does not seem to be helping. He denies fever, swelling, throat discomfort, difficulty swallowing, or difficulty breathing.    Review of Systems   Constitutional: Negative for activity change, appetite change, chills, fatigue and fever.   HENT: Negative.    Respiratory: Negative for cough, chest tightness, shortness of breath and wheezing.    Cardiovascular: Negative for chest pain.   Gastrointestinal: Negative for abdominal pain, diarrhea, nausea and vomiting.   Musculoskeletal: Negative for arthralgias, back pain, joint swelling, myalgias and neck pain.   Skin: Positive for rash.   Neurological: Negative for headaches.   All other systems reviewed and are negative.      Past Medical History:   Diagnosis Date   • Anxiety    • Chest pain    • Colon cancer (CMS/[facility]) 2016    Large Low Rectal Cancer with invasion to right pelvic sidewall   • Difficulty in urination     \"EACH DAY GETTING WORSE....I MAY HAVE TO GO TO E.R. BEFORE SURG IF I NEED TO\"   • History of sleep apnea     CANT WEAR MACHINE   • Ileostomy in place (CMS/[facility])     TEMPORARY  ....SURGERY MAY 9 2016     • Rectal cancer (CMS/[facility]) 03/30/2016   • Seasonal allergies    • Urinary frequency    • Urinary incontinence     \"GETTING WORSE\"   • Vitamin B 12 deficiency 10/18/2016       Family " History   Problem Relation Age of Onset   • Colon cancer Maternal Grandmother 70   • Heart disease Father         first heart attack age 48   • Hypertension Father    • Diabetes Father        Social History     Socioeconomic History   • Marital status:      Spouse name: Not on file   • Number of children: Not on file   • Years of education: College   • Highest education level: Not on file   Tobacco Use   • Smoking status: Never Smoker   • Smokeless tobacco: Never Used   • Tobacco comment: Daily caffeine use   Substance and Sexual Activity   • Alcohol use: Yes     Comment: Occasional   • Drug use: No   • Sexual activity: Defer         There were no vitals taken for this visit.    PHYSICAL EXAM (patient guided)  Video Visit  Physical Exam   Constitutional: He is oriented to person, place, and time. He appears well-developed and well-nourished. He does not have a sickly appearance. He does not appear ill. No distress.   HENT:   Head: Normocephalic and atraumatic.   Right Ear: External ear normal.   Left Ear: External ear normal.   Nose: Nose normal.   Eyes: Conjunctivae and EOM are normal.   Pulmonary/Chest: Effort normal. No stridor.  No respiratory distress. He no audible wheeze...  Lymphadenopathy:     He has no cervical adenopathy.   Neurological: He is alert and oriented to person, place, and time.   Skin: Skin is dry. Rash noted.        Quarter and nickel sized welts noted on right side of neck, below ear and jawline and quarter sized one noted on right upper arm, outer aspect near elbow; erythremic and slightly raised. No drainage, no blistering; no surrounding edema, no streaking.            Diagnoses and all orders for this visit:    1. Insect bite, unspecified site, initial encounter (Primary)  -     triamcinolone (KENALOG) 0.1 % cream; Apply  topically to the appropriate area as directed 2 (Two) Times a Day for 7 days.  Dispense: 15 g; Refill: 0  -     levocetirizine (XYZAL) 5 MG tablet; Take 1  tablet by mouth Every Evening for 7 days.  Dispense: 7 tablet; Refill: 0    Plan of Care:  Wash area with mild soap and water; keep dry and avoid touching or scratching; medications as prescribed; follow up in person at nearest UC/ER for no improvement in 2-3 days or sooner for new or worsening symptoms. Patient verbalized understanding.       FOLLOW-UP  As discussed during visit with PCP/[facility] if no improvement or Urgent Care/Emergency Department if worsening of symptoms    Patient verbalizes understanding of medication dosage, comfort measures, instructions for treatment and follow-up.    RAI Veloz  08/03/2021  19:33 EDT    This visit was performed via Telehealth.  This patient has been instructed to follow-up with their primary care provider if their symptoms worsen or the treatment provided does not resolve their illness.      Time spent on this patient 30 min.

## 2021-08-03 NOTE — PATIENT INSTRUCTIONS
Levocetirizine Oral Tablets  What is this medicine?  LEVOCETIRIZINE (CIPRIANO rojo) is an antihistamine. This medicine is used to treat or prevent symptoms of allergies. It is also used to help reduce itchy skin rash and hives.  This medicine may be used for other purposes; ask your health care provider or pharmacist if you have questions.  COMMON BRAND NAME(S): Xyzal, Xyzal Allergy 24 Hour  What should I tell my health care provider before I take this medicine?  They need to know if you have any of these conditions:  · kidney disease  · an unusual or allergic reaction to levocetirizine, cetirizine, hydroxyzine, other medicines, foods, dyes, or preservatives  · pregnant or trying to get pregnant  · breast-feeding  How should I use this medicine?  Take this medicine by mouth with a glass of water. Take it at night. The tablet may be split in half. Do not chew the tablets. Follow the directions on the prescription label. You can take it with or without food. Do not take more medicine than directed. You may need to take this medicine for several days before your symptoms improve.  Talk to your pediatrician regarding the use of this medicine in children. While this drug may be prescribed for children as young as 6 years old for selected conditions, precautions do apply.  Overdosage: If you think you have taken too much of this medicine contact a poison control center or emergency room at once.  NOTE: This medicine is only for you. Do not share this medicine with others.  What if I miss a dose?  If you miss a dose, take it as soon as you can. If it is almost time for your next dose, take only that dose. Do not take double or extra doses.  What may interact with this medicine?  · alcohol  · MAOIs like Carbex, Eldepryl, Marplan, Nardil, and Parnate  · medicines that cause drowsiness or sleep  · other medicines for colds or allergies  · ritonavir  · theophylline  This list may not describe all possible interactions.  Give your health care provider a list of all the medicines, herbs, non-prescription drugs, or dietary supplements you use. Also tell them if you smoke, drink alcohol, or use illegal drugs. Some items may interact with your medicine.  What should I watch for while using this medicine?  Visit your doctor or health care professional for regular checks on your health. Tell your doctor or healthcare professional if your symptoms do not start to get better or if they get worse.  You may get drowsy or dizzy. Do not drive, use machinery, or do anything that needs mental alertness until you know how this medicine affects you. Do not stand or sit up quickly, especially if you are an older patient. This reduces the risk of dizzy or fainting spells. Alcohol may interfere with the effect of this medicine. Avoid alcoholic drinks.  Your mouth may get dry. Chewing sugarless gum or sucking hard candy, and drinking plenty of water may help. Contact your doctor if the problem does not go away or is severe.  What side effects may I notice from receiving this medicine?  Side effects that you should report to your doctor or health care professional as soon as possible:  · allergic reactions like skin rash, itching or hives, swelling of the face, lips, or tongue  · changes in vision or hearing  · fever  · trouble passing urine or change in the amount of urine  Side effects that usually do not require medical attention (report to your doctor or health care professional if they continue or are bothersome):  · cough  · dizziness  · drowsiness or tiredness  · dry mouth  · muscle aches  This list may not describe all possible side effects. Call your doctor for medical advice about side effects. You may report side effects to FDA at 1-741-FDA-5169.  Where should I keep my medicine?  Keep out of the reach of children.  Store at room temperature between 15 and 30 degrees C (59 and 86 degrees F). Throw away any unused medicine after the expiration  date.  NOTE: This sheet is a summary. It may not cover all possible information. If you have questions about this medicine, talk to your doctor, pharmacist, or health care provider.  © 2021 Elsevier/Gold Standard (2009-09-09 11:17:47)  Triamcinolone skin cream, ointment, lotion, or aerosol  What is this medicine?  TRIAMCINOLONE (trye am SIN oh lone) is a corticosteroid. It is used on the skin to reduce swelling, redness, itching, and allergic reactions.  This medicine may be used for other purposes; ask your health care provider or pharmacist if you have questions.  COMMON BRAND NAME(S): Aristocort, Aristocort A, Aristocort HP, Cinalog, Cinolar, DERMASORB TA Complete, Flutex, Kenalog, Pediaderm TA, Sila III, SP Rx 228, Triacet, Trianex, Triderm  What should I tell my health care provider before I take this medicine?  They need to know if you have any of these conditions:  · any active infection  · large areas of burned or damaged skin  · skin wasting or thinning  · an unusual or allergic reaction to triamcinolone, corticosteroids, other medicines, foods, dyes, or preservatives  · pregnant or trying to get pregnant  · breast-feeding  How should I use this medicine?  This medicine is for external use only. Do not take by mouth. Follow the directions on the prescription label. Wash your hands before and after use. Apply a thin film of medicine to the affected area. Do not cover with a bandage or dressing unless your doctor or health care professional tells you to. Do not use on healthy skin or over large areas of skin. Do not get this medicine in your eyes. If you do, rinse out with plenty of cool tap water. It is important not to use more medicine than prescribed. Do not use your medicine more often than directed.  Talk to your pediatrician regarding the use of this medicine in children. Special care may be needed.  Elderly patients are more likely to have damaged skin through aging, and this may increase side  effects. This medicine should only be used for brief periods and infrequently in older patients.  Overdosage: If you think you have taken too much of this medicine contact a poison control center or emergency room at once.  NOTE: This medicine is only for you. Do not share this medicine with others.  What if I miss a dose?  If you miss a dose, use it as soon as you can. If it is almost time for your next dose, use only that dose. Do not use double or extra doses.  What may interact with this medicine?  Interactions are not expected.  This list may not describe all possible interactions. Give your health care provider a list of all the medicines, herbs, non-prescription drugs, or dietary supplements you use. Also tell them if you smoke, drink alcohol, or use illegal drugs. Some items may interact with your medicine.  What should I watch for while using this medicine?  Tell your doctor or health care professional if your symptoms do not start to get better within one week. Do not use for more than 14 days. Do not use on healthy skin or over large areas of skin. Tell your doctor or health care professional if you are exposed to anyone with measles or chickenpox, or if you develop sores or blisters that do not heal properly.  Do not use an airtight bandage to cover the affected area unless your doctor or health care professional tells you to. If you are to cover the area, follow the instructions carefully. Covering the area where the medicine is applied can increase the amount that passes through the skin and increases the risk of side effects.  If treating the diaper area of a child, avoid covering the treated area with tight-fitting diapers or plastic pants. This may increase the amount of medicine that passes through the skin and increase the risk of serious side effects.  What side effects may I notice from receiving this medicine?  Side effects that you should report to your doctor or health care professional as  soon as possible:  · burning or itching of the skin  · dark red spots on the skin  · infection  · painful, red, pus filled blisters in hair follicles  · thinning of the skin, sunburn more likely especially on the face  Side effects that usually do not require medical attention (report to your doctor or health care professional if they continue or are bothersome):  · dry skin, irritation  · unusual increased growth of hair on the face or body  This list may not describe all possible side effects. Call your doctor for medical advice about side effects. You may report side effects to FDA at 5-505-LLL-2014.  Where should I keep my medicine?  Keep out of the reach of children.  Store at room temperature between 15 and 30 degrees C (59 and 86 degrees F). Do not freeze. Throw away any unused medicine after the expiration date.  NOTE: This sheet is a summary. It may not cover all possible information. If you have questions about this medicine, talk to your doctor, pharmacist, or health care provider.  © 2021 [employer]vier/Gold Standard (2019-09-19 10:50:30)  Insect Bite, Adult  An insect bite can make your skin red, itchy, and swollen. Some insects can spread disease to people with a bite. However, most insect bites do not lead to disease, and most are not serious.  What are the causes?  Insects may bite for many reasons, including:  · Hunger.  · To defend themselves.  Insects that bite include:  · Spiders.  · Mosquitoes.  · Ticks.  · Fleas.  · Ants.  · Flies.  · Kissing bugs.  · Chiggers.  What are the signs or symptoms?  Symptoms of this condition include:  · Itching or pain in the bite area.  · Redness and swelling in the bite area.  · An open wound (skin ulcer).  Symptoms often last for 2-4 days.  In rare cases, a person may have a very bad allergic reaction (anaphylactic reaction) to a bite. Symptoms of an anaphylactic reaction may include:  · Feeling warm in the face (flushed). Your face may turn red.  · Itchy, red,  "swollen areas of skin (hives).  · Swelling of the:  ? Eyes.  ? Lips.  ? Face.  ? Mouth.  ? Tongue.  ? Throat.  · Trouble with any of these:  ? Breathing.  ? Talking.  ? Swallowing.  · Loud breathing (wheezing).  · Feeling dizzy or light-headed.  · Passing out (fainting).  · Pain or cramps in your belly.  · Throwing up (vomiting).  · Watery poop (diarrhea).  How is this treated?  Treatment is usually not needed. Symptoms often go away on their own. When treatment is needed, it may involve:  · Putting a cream or lotion on the bite area. This helps with itching.  · Taking an antibiotic medicine. This treatment is needed if the bite area gets infected.  · Getting a tetanus shot, if you are not up to date on this vaccine.  · Putting ice on the affected area.  · Using medicines called antihistamines. This treatment may be needed if you have itching or an allergic reaction to the insect bite.  · Giving yourself a shot of medicine (epinephrine) using an auto-injector \"pen\" if you have an anaphylactic reaction to a bite. Your doctor will teach you how to use this pen.  Follow these instructions at home:  Bite area care    · Do not scratch the bite area.  · Keep the bite area clean and dry.  · Wash the bite area every day with soap and water as told by your doctor.  · Check the bite area every day for signs of infection. Check for:  ? Redness, swelling, or pain.  ? Fluid or blood.  ? Warmth.  ? Pus or a bad smell.  Managing pain, itching, and swelling    · You may put any of these on the bite area as told by your doctor:  ? A paste made of baking soda and water.  ? Cortisone cream.  ? Calamine lotion.  · If told, put ice on the bite area.  ? Put ice in a plastic bag.  ? Place a towel between your skin and the bag.  ? Leave the ice on for 20 minutes, 2-3 times a day.  General instructions  · Apply or take over-the-counter and prescription medicines only as told by your doctor.  · If you were prescribed an antibiotic medicine, " take or apply it as told by your doctor. Do not stop using the antibiotic even if your condition improves.  · Keep all follow-up visits as told by your doctor. This is important.  How is this prevented?  To help you have a lower risk of insect bites:  · When you are outside, wear clothing that covers your arms and legs.  · Use insect repellent. The best insect repellents contain one of these:  ? DEET.  ? Picaridin.  ? Oil of lemon eucalyptus (OLE).  ? [id number].  · Consider spraying your clothing with a pesticide called permethrin. Permethrin helps prevent insect bites. It works for several weeks and for up to 5-6 clothing washes. Do not apply permethrin directly to the skin.  · If your home windows do not have screens, think about putting some in.  · If you will be sleeping in an area where there are mosquitoes, consider covering your sleeping area with a mosquito net.  Contact a doctor if:  · You have redness, swelling, or pain in the bite area.  · You have fluid or blood coming from the bite area.  · The bite area feels warm to the touch.  · You have pus or a bad smell coming from the bite area.  · You have a fever.  Get help right away if:  · You have joint pain.  · You have a rash.  · You feel more tired or sleepy than you normally do.  · You have neck pain.  · You have a headache.  · You feel weaker than you normally do.  · You have signs of an anaphylactic reaction. Signs may include:  ? Feeling warm in the face.  ? Itchy, red, swollen areas of skin.  ? Swelling of your:  § Eyes.  § Lips.  § Face.  § Mouth.  § Tongue.  § Throat.  ? Trouble with any of these:  § Breathing.  § Talking.  § Swallowing.  ? Loud breathing.  ? Feeling dizzy or light-headed.  ? Passing out.  ? Pain or cramps in your belly.  ? Throwing up.  ? Watery poop.  These symptoms may be an emergency. Do not wait to see if the symptoms will go away. Do this right away:  · Use your auto-injector pen as you have been told.  · Get medical help. Call  your local emergency services (911 in the [place] Do not drive yourself to the hospital.  Summary  · An insect bite can make your skin red, itchy, and swollen.  · Treatment is usually not needed. Symptoms often go away on their own.  · Do not scratch the bite area. Keep it clean and dry.  · Ice can help with pain and itching from the bite.  This information is not intended to replace advice given to you by your health care provider. Make sure you discuss any questions you have with your health care provider.  Document Revised: 06/28/2019 Document Reviewed: 06/28/2019  "[employer] Patient Education © 2021 "[employer] Inc.  Contact Dermatitis  Dermatitis is redness, soreness, and swelling (inflammation) of the skin. Contact dermatitis is a reaction to something that touches the skin.  There are two types of contact dermatitis:  · Irritant contact dermatitis. This happens when something bothers (irritates) your skin, like soap.  · Allergic contact dermatitis. This is caused when you are exposed to something that you are allergic to, such as poison ivy.  What are the causes?  · Common causes of irritant contact dermatitis include:  ? Makeup.  ? Soaps.  ? Detergents.  ? Bleaches.  ? Acids.  ? Metals, such as nickel.  · Common causes of allergic contact dermatitis include:  ? Plants.  ? Chemicals.  ? Jewelry.  ? Latex.  ? Medicines.  ? Preservatives in products, such as clothing.  What increases the risk?  · Having a job that exposes you to things that bother your skin.  · Having asthma or eczema.  What are the signs or symptoms?  Symptoms may happen anywhere the irritant has touched your skin. Symptoms include:  · Dry or flaky skin.  · Redness.  · Cracks.  · Itching.  · Pain or a burning feeling.  · Blisters.  · Blood or clear fluid draining from skin cracks.  With allergic contact dermatitis, swelling may occur. This may happen in places such as the eyelids, mouth, or genitals.  How is this treated?  · This condition is  treated by checking for the cause of the reaction and protecting your skin. Treatment may also include:  ? Steroid creams, ointments, or medicines.  ? Antibiotic medicines or other ointments, if you have a skin infection.  ? Lotion or medicines to help with itching.  ? A bandage (dressing).  Follow these instructions at home:  Skin care  · Moisturize your skin as needed.  · Put cool cloths on your skin.  · Put a baking soda paste on your skin. Stir water into baking soda until it looks like a paste.  · Do not scratch your skin.  · Avoid having things rub up against your skin.  · Avoid the use of soaps, perfumes, and dyes.  Medicines  · Take or apply over-the-counter and prescription medicines only as told by your doctor.  · If you were prescribed an antibiotic medicine, take or apply it as told by your doctor. Do not stop using it even if your condition starts to get better.  Bathing  · Take a bath with:  ? Epsom salts.  ? Baking soda.  ? Colloidal oatmeal.  · Bathe less often.  · Bathe in warm water. Avoid using hot water.  Bandage care  · If you were given a bandage, change it as told by your health care provider.  · Wash your hands with soap and water before and after you change your bandage. If soap and water are not available, use hand .  General instructions  · Avoid the things that caused your reaction. If you do not know what caused it, keep a journal. Write down:  ? What you eat.  ? What skin products you use.  ? What you drink.  ? What you wear in the area that has symptoms. This includes jewelry.  · Check the affected areas every day for signs of infection. Check for:  ? More redness, swelling, or pain.  ? More fluid or blood.  ? Warmth.  ? Pus or a bad smell.  · Keep all follow-up visits as told by your doctor. This is important.  Contact a doctor if:  · You do not get better with treatment.  · Your condition gets worse.  · You have signs of infection, such as:  ? More  swelling.  ? Tenderness.  ? More redness.  ? Soreness.  ? Warmth.  · You have a fever.  · You have new symptoms.  Get help right away if:  · You have a very bad headache.  · You have neck pain.  · Your neck is stiff.  · You throw up (vomit).  · You feel very sleepy.  · You see red streaks coming from the area.  · Your bone or joint near the area hurts after the skin has healed.  · The area turns darker.  · You have trouble breathing.  Summary  · Dermatitis is redness, soreness, and swelling of the skin.  · Symptoms may occur where the irritant has touched you.  · Treatment may include medicines and skin care.  · If you do not know what caused your reaction, keep a journal.  · Contact a doctor if your condition gets worse or you have signs of infection.  This information is not intended to replace advice given to you by your health care provider. Make sure you discuss any questions you have with your health care provider.  Document Revised: 04/08/2020 Document Reviewed: 07/03/2019  [employer]vier Patient Education © 2021 [employer]

## 2021-11-13 ENCOUNTER — IMMUNIZATION (OUTPATIENT)
Dept: VACCINE CLINIC | Facility: HOSPITAL | Age: 56
End: 2021-11-13

## 2021-11-13 PROCEDURE — 0004A ADM SARSCOV2 30MCG/0.3ML BOOSTER: CPT | Performed by: INTERNAL MEDICINE

## 2021-11-13 PROCEDURE — 91300 HC SARSCOV02 VAC 30MCG/0.3ML IM: CPT | Performed by: INTERNAL MEDICINE

## 2021-11-29 RX ORDER — ESCITALOPRAM OXALATE 20 MG/1
20 TABLET ORAL DAILY
Qty: 90 TABLET | Refills: 1 | Status: SHIPPED | OUTPATIENT
Start: 2021-11-29 | End: 2022-03-10 | Stop reason: SDUPTHER

## 2021-12-08 ENCOUNTER — APPOINTMENT (OUTPATIENT)
Dept: PET IMAGING | Facility: HOSPITAL | Age: 56
End: 2021-12-08

## 2021-12-15 ENCOUNTER — APPOINTMENT (OUTPATIENT)
Dept: LAB | Facility: HOSPITAL | Age: 56
End: 2021-12-15

## 2022-03-10 RX ORDER — ESCITALOPRAM OXALATE 20 MG/1
20 TABLET ORAL DAILY
Qty: 90 TABLET | Refills: 3 | Status: SHIPPED | OUTPATIENT
Start: 2022-03-10

## 2022-09-14 ENCOUNTER — TELEPHONE (OUTPATIENT)
Dept: ONCOLOGY | Facility: OTHER | Age: 57
End: 2022-09-14

## 2022-09-14 NOTE — TELEPHONE ENCOUNTER
Caller: Dane Viera    Relationship: Self    Best call back number: [phone]    What is the best time to reach you: ANYTIME    Who are you requesting to speak with (clinical staff, provider,  specific staff member): SCHEDULING    What was the call regarding: PT CALLING TO SCHED HIS MISSED CT AND F/U APPTS FROM December 2021.     PT PREFERS EARLY MORNING APPT TIMES.    PLEASE CALL TO SCHED.      Do you require a callback: YES

## 2022-09-15 DIAGNOSIS — Z15.09 LYNCH SYNDROME: ICD-10-CM

## 2022-09-15 DIAGNOSIS — C20 RECTAL CANCER: Primary | ICD-10-CM

## 2022-10-26 ENCOUNTER — LAB (OUTPATIENT)
Dept: LAB | Facility: HOSPITAL | Age: 57
End: 2022-10-26

## 2022-10-26 ENCOUNTER — HOSPITAL ENCOUNTER (OUTPATIENT)
Dept: PET IMAGING | Facility: HOSPITAL | Age: 57
Discharge: HOME | End: 2022-10-26
Admitting: INTERNAL MEDICINE

## 2022-10-26 DIAGNOSIS — C20 RECTAL CANCER: ICD-10-CM

## 2022-10-26 DIAGNOSIS — Z15.09 LYNCH SYNDROME: ICD-10-CM

## 2022-10-26 LAB
ALBUMIN SERPL-MCNC: 4.8 G/DL (ref 3.5–5.2)
ALBUMIN/GLOB SERPL: 1.7 G/DL (ref 1.1–2.4)
ALP SERPL-CCNC: 83 U/L (ref 38–116)
ALT SERPL W P-5'-P-CCNC: 30 U/L (ref 0–41)
ANION GAP SERPL CALCULATED.3IONS-SCNC: 18.5 MMOL/L (ref 5–15)
AST SERPL-CCNC: 26 U/L (ref 0–40)
BASOPHILS # BLD AUTO: 0.06 10*3/MM3 (ref 0–0.2)
BASOPHILS NFR BLD AUTO: 1.1 % (ref 0–1.5)
BILIRUB SERPL-MCNC: 0.7 MG/DL (ref 0.2–1.2)
BUN SERPL-MCNC: 11 MG/DL (ref 6–20)
BUN/CREAT SERPL: 12.8 (ref 7.3–30)
CALCIUM SPEC-SCNC: 9.9 MG/DL (ref 8.5–10.2)
CEA SERPL-MCNC: 1.7 NG/ML
CHLORIDE SERPL-SCNC: 98 MMOL/L (ref 98–107)
CO2 SERPL-SCNC: 23.5 MMOL/L (ref 22–29)
CREAT BLDA-MCNC: 0.8 MG/DL (ref 0.6–1.3)
CREAT SERPL-MCNC: 0.86 MG/DL (ref 0.7–1.3)
DEPRECATED RDW RBC AUTO: 41.8 FL (ref 37–54)
EGFRCR SERPLBLD CKD-EPI 2021: 102.3 ML/MIN/1.73
EOSINOPHIL # BLD AUTO: 0.16 10*3/MM3 (ref 0–0.4)
EOSINOPHIL NFR BLD AUTO: 2.9 % (ref 0.3–6.2)
ERYTHROCYTE [DISTWIDTH] IN BLOOD BY AUTOMATED COUNT: 12.2 % (ref 12.3–15.4)
GLOBULIN UR ELPH-MCNC: 2.8 GM/DL (ref 1.8–3.5)
GLUCOSE SERPL-MCNC: 67 MG/DL (ref 74–124)
HCT VFR BLD AUTO: 45.5 % (ref 37.5–51)
HGB BLD-MCNC: 15.5 G/DL (ref 13–17.7)
IMM GRANULOCYTES # BLD AUTO: 0.01 10*3/MM3 (ref 0–0.05)
IMM GRANULOCYTES NFR BLD AUTO: 0.2 % (ref 0–0.5)
LYMPHOCYTES # BLD AUTO: 1.6 10*3/MM3 (ref 0.7–3.1)
LYMPHOCYTES NFR BLD AUTO: 29.2 % (ref 19.6–45.3)
MCH RBC QN AUTO: 31.6 PG (ref 26.6–33)
MCHC RBC AUTO-ENTMCNC: 34.1 G/DL (ref 31.5–35.7)
MCV RBC AUTO: 92.7 FL (ref 79–97)
MONOCYTES # BLD AUTO: 0.55 10*3/MM3 (ref 0.1–0.9)
MONOCYTES NFR BLD AUTO: 10 % (ref 5–12)
NEUTROPHILS NFR BLD AUTO: 3.1 10*3/MM3 (ref 1.7–7)
NEUTROPHILS NFR BLD AUTO: 56.6 % (ref 42.7–76)
NRBC BLD AUTO-RTO: 0 /100 WBC (ref 0–0.2)
PLATELET # BLD AUTO: 264 10*3/MM3 (ref 140–450)
PMV BLD AUTO: 9.4 FL (ref 6–12)
POTASSIUM SERPL-SCNC: 3.3 MMOL/L (ref 3.5–4.7)
PROT SERPL-MCNC: 7.6 G/DL (ref 6.3–8)
RBC # BLD AUTO: 4.91 10*6/MM3 (ref 4.14–5.8)
SODIUM SERPL-SCNC: 140 MMOL/L (ref 134–145)
WBC NRBC COR # BLD: 5.48 10*3/MM3 (ref 3.4–10.8)

## 2022-10-26 PROCEDURE — 85025 COMPLETE CBC W/AUTO DIFF WBC: CPT

## 2022-10-26 PROCEDURE — 0 DIATRIZOATE MEGLUMINE & SODIUM PER 1 ML: Performed by: INTERNAL MEDICINE

## 2022-10-26 PROCEDURE — 74177 CT ABD & PELVIS W/CONTRAST: CPT

## 2022-10-26 PROCEDURE — 82565 ASSAY OF CREATININE: CPT

## 2022-10-26 PROCEDURE — 25010000002 IOPAMIDOL 61 % SOLUTION: Performed by: INTERNAL MEDICINE

## 2022-10-26 PROCEDURE — 36415 COLL VENOUS BLD VENIPUNCTURE: CPT

## 2022-10-26 PROCEDURE — 80053 COMPREHEN METABOLIC PANEL: CPT

## 2022-10-26 PROCEDURE — 82378 CARCINOEMBRYONIC ANTIGEN: CPT | Performed by: INTERNAL MEDICINE

## 2022-10-26 PROCEDURE — 71260 CT THORAX DX C+: CPT

## 2022-10-26 RX ADMIN — IOPAMIDOL 85 ML: 612 INJECTION, SOLUTION INTRAVENOUS at 08:02

## 2022-10-26 RX ADMIN — DIATRIZOATE MEGLUMINE AND DIATRIZOATE SODIUM 30 ML: 660; 100 LIQUID ORAL; RECTAL at 07:10

## 2022-11-04 ENCOUNTER — APPOINTMENT (OUTPATIENT)
Dept: LAB | Facility: HOSPITAL | Age: 57
End: 2022-11-04

## 2022-11-04 ENCOUNTER — OFFICE VISIT (OUTPATIENT)
Dept: ONCOLOGY | Facility: CLINIC | Age: 57
End: 2022-11-04

## 2022-11-04 VITALS
HEART RATE: 67 BPM | TEMPERATURE: 97.5 F | DIASTOLIC BLOOD PRESSURE: 80 MMHG | OXYGEN SATURATION: 96 % | RESPIRATION RATE: 16 BRPM | HEIGHT: 70 IN | WEIGHT: 180.9 LBS | SYSTOLIC BLOOD PRESSURE: 126 MMHG | BODY MASS INDEX: 25.9 KG/M2

## 2022-11-04 DIAGNOSIS — C20 RECTAL CANCER: ICD-10-CM

## 2022-11-04 DIAGNOSIS — Z15.09 LYNCH SYNDROME: Primary | ICD-10-CM

## 2022-11-04 PROCEDURE — 99214 OFFICE O/P EST MOD 30 MIN: CPT | Performed by: INTERNAL MEDICINE

## 2022-11-04 NOTE — PROGRESS NOTES
Subjective    REASONS FOR FOLLOW-UP :  1.  Rectal cancer T3N1M0 treated with neoadjuvant chemoradiation  2. ypT3N0 at surgery 5/17 with a positive radial margin reexcised and treated with radiation boost to the right pelvic sidewall postoperatively  3.  Adjuvant FOLFOX 6 x 8 completed in 10/16  4.  Additional surgery in 11/16 with colostomy reversal  5.  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  Unexplained small bilateral pleural effusions first noted in 5/17 not seen in 11/16 CAT scan of the chest resolved in 10/17-recurred in 4/18    History of Present Illness    Mr. Viera is a 54year-old man returning today for follow-up for rectal cancer.  Genetic testing wis consistent with Mathias syndrome with a PMS 2 mutation which is associated with lower risk of subsequent colon cancer then the MLH1 and 2 mutations    .  Continues on aspirin 650 daily for polyp prevention    He is here to review his CAT scans and CEA and both of these are stable.  There was a mention of a 6 cm node in his retroperitoneum but I think this is a typo and I have contacted the radiologist to clarify this.  At this point he is 6 years out from completion of treatment and does not need follow-up in our office he does note to continue with frequent upper and lower endoscopies with Dr. Aguilera    His B12 levels were low at some point we will recheck it today.    He is at very low risk for urothelial or pancreatic malignancies with negative family history and I recommended he get a primary care doctor to follow his PSAs and cholesterol lipids etc. which he has not done    Active Ambulatory Problems     Diagnosis Date Noted   • Rectal cancer (HCC) 03/30/2016   • B12 deficiency 10/18/2016   • Mathias syndrome 06/08/2021     Resolved Ambulatory Problems     Diagnosis Date Noted   • No Resolved Ambulatory Problems     Past Medical History:   Diagnosis Date   • Anxiety    • Chest pain    • Colon cancer (HCC) 2016    • Difficulty in urination    • History of sleep apnea    • Ileostomy in place (HCC)    • Seasonal allergies    • Urinary frequency    • Urinary incontinence    • Vitamin B 12 deficiency 10/18/2016     Past Surgical History:   Procedure Laterality Date   • COLONOSCOPY W/ BIOPSIES N/A 2016    Dr. Tree Avila- MALIGNANT    • CYSTOSCOPY URETHROTOMY VISUAL INTERNAL N/A 7/28/2016    Procedure: dilation of uretheral stricture and catheter placement;  Surgeon: Richard Boston MD;  Location: [facility] OR;  Service:    • FOOT SURGERY Left 1983    Benign cyst left foot   • LA INSJ TUNNELED CVC W/O SUBQ PORT/ AGE 7 YR/> Left 6/22/2016    Procedure: Power Port Placement;  Surgeon: Keely Bermudez MD;  Location: [facility];  Service: General   • SINUS SURGERY  1996   • TRANSANAL RECTAL RESECTION N/A 05/09/2016    Rectal resection and hand-sewn colon-pouch-anal anastomosis with splenic flexure mobilization, and clipping of right pelvic sidewall for postoperative radiation boost, omental pedicle, Dr. Emily Aguilera         Oncology/Hematology History Overview Note   1.  Rectal cancer diagnosed January 2016 after presenting with a several month history of rectal bleeding, diarrhea and 10 lb weight loss; clinical stage T3N1M0  2.  Completed neoadjuvant Xeloda with radiation (5040cGy) 3/2/16  3. Surgery May 9 2016  pathology revealed a residual 5cm invasive mucinous adenocarcinoma, low grade, invading through the muscularis propria and into the perirectal adipose tissue with an initial positive radial margin. There was no evidence of lymphvascular space invasion and thirteen lymph nodes were negative for metastatic involvement. Additional distal and radial margin re-excision specimens were negative for carcinoma and excision of right pelvic lateral sidewall was negative for carcinoma. According to Dr. Aguilera's operative report, the rectum/rectal tumor was densely adhered to the right pelvic side wall and she  placed clips in this area for possible adjuvant radiation boost. The initial frozen margins came back negative but due to her concern, she submitted additional right lateral and distal margins and according to the pathology report these additional specimens were negative for carcinoma. However, a comment on the path report states that an permanent sections an additional section of tumor bed was taken, revealing invasive carcinoma that focally extended to the radial margin and this positive margin was within 5mm fo the area taken on frozen section and it is unclear whether the additional radial margin would have covered this; he completed a post-surgery radiation boost to the right pelvic sidewall  4.  Initiated adjuvant FOLFOX-6 6/29/16 and 8 cycles completed 10/18/16 with dose reductions secondary to neuropathy and diarrhea     Rectal cancer (HCC)   1/19/2016 Initial Diagnosis    Rectal cancer     5/17   He was treated neoadjuvantly with Xeloda and radiation to a dose of 5040 cGy completed in early March 2016.  He then underwent surgical resection by Dr. Aguilera with final pathology showing a low-grade mucinous adenocarcinoma T3 N0 M0 with a questionable positive radial pelvic sidewall margin.  Surgical clips were placed in the area of concern in the pelvic sidewall, and he completed a radiation boost to this area.  The tumor had a regression score of 2 and histologic features suggestive of MSI; immunohistochemistry of tumor cells was positive for MLH-1, MSH-2, and MSH-6.  He completed 8 cycles of adjuvant FOLFOX 6/10/1816 with dose reductions for neuropathy and diarrhea.    Patient returns today to review his CAT scans and overall is doing fairly well.  He has no cough or shortness of breath weight and appetite are stable.  His CEA was 1.92 which is normal.  CAT scans show minimal small pleural effusions which were not seen in November prior to his second surgery.  There are very small and not tappable and I  suspect there residual from his surgery and I will try and get some x-rays from [facility] if they were done postoperatively.  I recommended we repeat his chest CT in 3 months to make sure there is resolution and no progression of these findings and overall I'm not very worried that these are related to metastatic disease.    He has had blood drawn for genetic testing consistent with Mathias syndrome with a PMS 2 mutation and the VUS in the DICER gene      SOCIAL HISTORY: He is , has 1 daughter. He is currently employed as a . He has never smoked. Drinks 2-3 glasses of wine per month.  Denies illicit drug use or HIV risk factors.     FAMILY HISTORY: His maternal grandmother had colon cancer in her 70s. No other malignancies in the family. His father had heart disease, hypertension and diabetes.         Review of Systems   Constitutional: Negative for activity change, chills, fatigue and unexpected weight change.   HENT: Negative for trouble swallowing.    Respiratory: Negative for cough, choking, chest tightness, shortness of breath and wheezing.    Cardiovascular: Negative for chest pain and leg swelling.   Gastrointestinal: Negative for abdominal pain, diarrhea, nausea and rectal pain.   Musculoskeletal: Positive for back pain (stable 6/4/2020). Negative for arthralgias, joint swelling and myalgias.   Skin: Negative for color change.   Neurological: Negative for dizziness, weakness and headaches.   Psychiatric/Behavioral: Positive for sleep disturbance (6/4/2020). The patient is not nervous/anxious.    All other systems reviewed and are negative.     A comprehensive 14 point review of systems was performed and was negative except as mentioned.    Medications:  The current medication list was reviewed in the EMR    ALLERGIES:  No Known Allergies    Objective      Vitals:    11/04/22 0827   BP: 126/80   Pulse: 67   Resp: 16   Temp: 97.5 °F (36.4 °C)   TempSrc: Temporal  "  SpO2: 96%   Weight: 82.1 kg (180 lb 14.4 oz)   Height: 177.8 cm (70\")   PainSc: 0-No pain     Current Status 11/4/2022   ECOG score 0       Physical Exam    GENERAL:  Well-developed, well-nourished in no acute distress.   SKIN:  Warm, dry without rashes, purpura or petechiae.  NECK:  Supple with good range of motion; no thyromegaly or masses, no JVD.  LYMPHATICS:  No cervical, supraclavicular,   CARDIAC: Regular rate and rhythm no murmurs  RESP: Clear  ABDOMEN:  Not examined  EXTREMITIES:  No clubbing, cyanosis or edema.Eczema on both hands without any joint changes  PSYCHIATRIC:  Normal affect and mood.  '  I have reexamined the patient and the results are consistent with the previously documented exam. Rakesh Pardo MD     RECENT LABS:  Hematology WBC   Date Value Ref Range Status   10/26/2022 5.48 3.40 - 10.80 10*3/mm3 Final     RBC   Date Value Ref Range Status   10/26/2022 4.91 4.14 - 5.80 10*6/mm3 Final     Hemoglobin   Date Value Ref Range Status   10/26/2022 15.5 13.0 - 17.7 g/dL Final     Hematocrit   Date Value Ref Range Status   10/26/2022 45.5 37.5 - 51.0 % Final     Platelets   Date Value Ref Range Status   10/26/2022 264 140 - 450 10*3/mm3 Final     Lab Results   Component Value Date    GLUCOSE 67 (L) 10/26/2022    BUN 11 10/26/2022    CREATININE 0.80 10/26/2022    EGFRIFNONA 91 05/05/2021    EGFRIFAFRI  09/20/2016      Comment:      <15 Indicative of kidney failure.    BCR 12.8 10/26/2022    K 3.3 (L) 10/26/2022    CO2 23.5 10/26/2022    CALCIUM 9.9 10/26/2022    ALBUMIN 4.80 10/26/2022    AST 26 10/26/2022    ALT 30 10/26/2022     cea 1.61       Ct CAP  FINDINGS:  CT CHEST: The visualized thyroid gland is normal. No pathological  mediastinal or hilar lymphadenopathy. No pericardial effusion is seen.  The central airways are patent without endobronchial lesion. There are  tiny bilateral layering effusions present. These are without interval  change from prior imaging. No suspicious pulmonary " nodule or focal  airspace disease is present. No pathological axillary lymphadenopathy.     CT ABDOMEN AND PELVIS: The liver demonstrates normal attenuation. No  focal hepatic lesion is seen. The spleen, gallbladder and the pancreas  is normal. Bilateral adrenal glands are normal. Both kidneys are normal  in size and attenuation. Small exophytic cortical cyst is seen at the  superior pole of the left kidney. The urinary bladder is minimally  distended with mild circumferential wall thickening. A primary  anastomosis is seen in the rectal region that appears unremarkable. No  evidence of bowel obstruction. A moderate to large amount of stool is  seen throughout the colon. There is no pathological retroperitoneal  lymphadenopathy.     IMPRESSION:  1. No convincing evidence of local recurrence or metastatic disease  within the chest, abdomen or pelvis.  2. Additional findings as above.           CT OF THE CHEST ABDOMEN AND PELVIS WITH CONTRAST 09/18/2019   IMPRESSION:  1. Tiny bilateral pleural effusions appear stable.  2. No new evidence of metastatic disease on this CT of the chest,  abdomen and pelvis.     This report was finalized on 9/19/2019 10:59 AM by Dr. Sampson Ridley M.D.    CT CHEST, ABDOMEN AND PELVIS WITH CONTRAST     IMPRESSION:  Stable exam. No convincing evidence of new metastatic  disease within the chest, abdomen or pelvis.      This report was finalized on 5/29/2020 3:08 PM by Dr. Margi Quinteros M.D.    Ct cap  IMPRESSION:  1.  No new convincing findings to suggest metastatic disease. Recommend  correlation with tumor markers and continued oncologic surveillance.  2.  Query mild hepatic steatosis.  3.  Please see above for additional findings.     This report was finalized on 10/27/2022     Assessment & Plan   1.t neoadjuvant chemoradiation for a clinical T3 N1 M0 rectal cancer.  At the time of surgery final pathology showed YT3 N0 M0 disease with 13 negative lymph nodes.  There was a  questionable positive radial margin involving the right pelvic sidewall and he completed a radiation boost to this area. He completed 8 cycles of FOLFOX 6 with dose reductions in October 2016.    · CAT scans negative as of 5/20/2020 with normal CEA  · CAT scans negative as of 10/22 CEA normal no further imaging planned  2.  Reversal of colostomy in 11/16  3.  Follow-up CAT scans and 5/17 showed bilateral very small pleural effusions of unknown etiology-Resolved in 11/17-back in 2018 but stable  4. mild peripheral neuropathy     5. Mathias syndrome PMS 2 gene mutation On aspirin 650 mg    Plan  Continue screening upper and lower endoscopies - not a candidate for pancreatic or urothelial screening  PSAs to be followed by primary care    No follow-up needed in our office we will call him if his B12 levels are low.            11/4/2022      CC:

## 2022-12-11 NOTE — TELEPHONE ENCOUNTER
----- Message from Dane Viera sent at 3/19/2020  1:37 PM EDT -----  Regarding: Non-Urgent Medical Question  Contact: [phone]  Hello,    Not sure if I had requested this again, though would like to see if you could add an order for cholesterol, triglycerides etc. for Friday morning when they take samples prior to my scan.    Thank you  
Lipid panel added per Juliana Per NP. Referral for PCP also placed per Juliana. Message sent to scheduling and Pt V/U.    
Patient and/or family announced that they are leaving. They were advised to stay, advised to return if worse.